# Patient Record
Sex: FEMALE | Race: ASIAN | NOT HISPANIC OR LATINO | Employment: FULL TIME | URBAN - METROPOLITAN AREA
[De-identification: names, ages, dates, MRNs, and addresses within clinical notes are randomized per-mention and may not be internally consistent; named-entity substitution may affect disease eponyms.]

---

## 2022-06-01 ENCOUNTER — TELEPHONE (OUTPATIENT)
Dept: SCHEDULING | Facility: IMAGING CENTER | Age: 27
End: 2022-06-01

## 2022-06-16 ENCOUNTER — TELEPHONE (OUTPATIENT)
Dept: SCHEDULING | Facility: IMAGING CENTER | Age: 27
End: 2022-06-16

## 2022-07-26 ENCOUNTER — OFFICE VISIT (OUTPATIENT)
Dept: MEDICAL GROUP | Facility: PHYSICIAN GROUP | Age: 27
End: 2022-07-26
Payer: COMMERCIAL

## 2022-07-26 VITALS
HEIGHT: 66 IN | DIASTOLIC BLOOD PRESSURE: 60 MMHG | OXYGEN SATURATION: 100 % | SYSTOLIC BLOOD PRESSURE: 90 MMHG | RESPIRATION RATE: 16 BRPM | TEMPERATURE: 98.3 F | WEIGHT: 120 LBS | BODY MASS INDEX: 19.29 KG/M2 | HEART RATE: 75 BPM

## 2022-07-26 DIAGNOSIS — F33.1 MODERATE EPISODE OF RECURRENT MAJOR DEPRESSIVE DISORDER (HCC): ICD-10-CM

## 2022-07-26 DIAGNOSIS — R00.0 INCREASED HEART RATE: ICD-10-CM

## 2022-07-26 DIAGNOSIS — R79.89 PROLACTIN INCREASED: ICD-10-CM

## 2022-07-26 PROBLEM — F32.9 MAJOR DEPRESSIVE DISORDER: Status: ACTIVE | Noted: 2022-07-26

## 2022-07-26 PROCEDURE — 93000 ELECTROCARDIOGRAM COMPLETE: CPT

## 2022-07-26 PROCEDURE — 99204 OFFICE O/P NEW MOD 45 MIN: CPT

## 2022-07-26 RX ORDER — BENZONATATE 100 MG/1
CAPSULE ORAL
COMMUNITY
Start: 2022-06-01 | End: 2022-07-26

## 2022-07-26 SDOH — ECONOMIC STABILITY: INCOME INSECURITY: IN THE LAST 12 MONTHS, WAS THERE A TIME WHEN YOU WERE NOT ABLE TO PAY THE MORTGAGE OR RENT ON TIME?: NO

## 2022-07-26 SDOH — ECONOMIC STABILITY: HOUSING INSECURITY: IN THE LAST 12 MONTHS, HOW MANY PLACES HAVE YOU LIVED?: 2

## 2022-07-26 SDOH — ECONOMIC STABILITY: HOUSING INSECURITY
IN THE LAST 12 MONTHS, WAS THERE A TIME WHEN YOU DID NOT HAVE A STEADY PLACE TO SLEEP OR SLEPT IN A SHELTER (INCLUDING NOW)?: NO

## 2022-07-26 SDOH — ECONOMIC STABILITY: FOOD INSECURITY: WITHIN THE PAST 12 MONTHS, THE FOOD YOU BOUGHT JUST DIDN'T LAST AND YOU DIDN'T HAVE MONEY TO GET MORE.: NEVER TRUE

## 2022-07-26 SDOH — ECONOMIC STABILITY: TRANSPORTATION INSECURITY
IN THE PAST 12 MONTHS, HAS LACK OF TRANSPORTATION KEPT YOU FROM MEETINGS, WORK, OR FROM GETTING THINGS NEEDED FOR DAILY LIVING?: NO

## 2022-07-26 SDOH — HEALTH STABILITY: PHYSICAL HEALTH: ON AVERAGE, HOW MANY MINUTES DO YOU ENGAGE IN EXERCISE AT THIS LEVEL?: 30 MIN

## 2022-07-26 SDOH — HEALTH STABILITY: PHYSICAL HEALTH: ON AVERAGE, HOW MANY DAYS PER WEEK DO YOU ENGAGE IN MODERATE TO STRENUOUS EXERCISE (LIKE A BRISK WALK)?: 1 DAY

## 2022-07-26 SDOH — ECONOMIC STABILITY: FOOD INSECURITY: WITHIN THE PAST 12 MONTHS, YOU WORRIED THAT YOUR FOOD WOULD RUN OUT BEFORE YOU GOT MONEY TO BUY MORE.: NEVER TRUE

## 2022-07-26 SDOH — ECONOMIC STABILITY: INCOME INSECURITY: HOW HARD IS IT FOR YOU TO PAY FOR THE VERY BASICS LIKE FOOD, HOUSING, MEDICAL CARE, AND HEATING?: NOT HARD AT ALL

## 2022-07-26 SDOH — HEALTH STABILITY: MENTAL HEALTH
STRESS IS WHEN SOMEONE FEELS TENSE, NERVOUS, ANXIOUS, OR CAN'T SLEEP AT NIGHT BECAUSE THEIR MIND IS TROUBLED. HOW STRESSED ARE YOU?: RATHER MUCH

## 2022-07-26 SDOH — ECONOMIC STABILITY: TRANSPORTATION INSECURITY
IN THE PAST 12 MONTHS, HAS THE LACK OF TRANSPORTATION KEPT YOU FROM MEDICAL APPOINTMENTS OR FROM GETTING MEDICATIONS?: NO

## 2022-07-26 SDOH — ECONOMIC STABILITY: TRANSPORTATION INSECURITY
IN THE PAST 12 MONTHS, HAS LACK OF RELIABLE TRANSPORTATION KEPT YOU FROM MEDICAL APPOINTMENTS, MEETINGS, WORK OR FROM GETTING THINGS NEEDED FOR DAILY LIVING?: NO

## 2022-07-26 ASSESSMENT — LIFESTYLE VARIABLES
SKIP TO QUESTIONS 9-10: 1
HOW MANY STANDARD DRINKS CONTAINING ALCOHOL DO YOU HAVE ON A TYPICAL DAY: PATIENT DOES NOT DRINK
HOW OFTEN DO YOU HAVE A DRINK CONTAINING ALCOHOL: NEVER
SUBSTANCE_ABUSE: 0
AUDIT-C TOTAL SCORE: 0
HOW OFTEN DO YOU HAVE SIX OR MORE DRINKS ON ONE OCCASION: NEVER

## 2022-07-26 ASSESSMENT — ENCOUNTER SYMPTOMS
SEIZURES: 0
FEVER: 0
DEPRESSION: 1
ABDOMINAL PAIN: 0
BLURRED VISION: 0
DIARRHEA: 0
SHORTNESS OF BREATH: 1
DIZZINESS: 0
WEAKNESS: 0
WEIGHT LOSS: 0
VOMITING: 0
HEADACHES: 0
CHILLS: 0
HEARTBURN: 0
CONSTIPATION: 0
WHEEZING: 0
COUGH: 0
NAUSEA: 0
DOUBLE VISION: 0

## 2022-07-26 ASSESSMENT — SOCIAL DETERMINANTS OF HEALTH (SDOH)
HOW OFTEN DO YOU GET TOGETHER WITH FRIENDS OR RELATIVES?: ONCE A WEEK
HOW HARD IS IT FOR YOU TO PAY FOR THE VERY BASICS LIKE FOOD, HOUSING, MEDICAL CARE, AND HEATING?: NOT HARD AT ALL
HOW OFTEN DO YOU ATTEND CHURCH OR RELIGIOUS SERVICES?: NEVER
DO YOU BELONG TO ANY CLUBS OR ORGANIZATIONS SUCH AS CHURCH GROUPS UNIONS, FRATERNAL OR ATHLETIC GROUPS, OR SCHOOL GROUPS?: NO
IN A TYPICAL WEEK, HOW MANY TIMES DO YOU TALK ON THE PHONE WITH FAMILY, FRIENDS, OR NEIGHBORS?: THREE TIMES A WEEK
HOW MANY DRINKS CONTAINING ALCOHOL DO YOU HAVE ON A TYPICAL DAY WHEN YOU ARE DRINKING: PATIENT DOES NOT DRINK
HOW OFTEN DO YOU ATTENT MEETINGS OF THE CLUB OR ORGANIZATION YOU BELONG TO?: NEVER
DO YOU BELONG TO ANY CLUBS OR ORGANIZATIONS SUCH AS CHURCH GROUPS UNIONS, FRATERNAL OR ATHLETIC GROUPS, OR SCHOOL GROUPS?: NO
HOW OFTEN DO YOU GET TOGETHER WITH FRIENDS OR RELATIVES?: ONCE A WEEK
HOW OFTEN DO YOU ATTENT MEETINGS OF THE CLUB OR ORGANIZATION YOU BELONG TO?: NEVER
HOW OFTEN DO YOU HAVE A DRINK CONTAINING ALCOHOL: NEVER
HOW OFTEN DO YOU ATTEND CHURCH OR RELIGIOUS SERVICES?: NEVER
IN A TYPICAL WEEK, HOW MANY TIMES DO YOU TALK ON THE PHONE WITH FAMILY, FRIENDS, OR NEIGHBORS?: THREE TIMES A WEEK
HOW OFTEN DO YOU HAVE SIX OR MORE DRINKS ON ONE OCCASION: NEVER
WITHIN THE PAST 12 MONTHS, YOU WORRIED THAT YOUR FOOD WOULD RUN OUT BEFORE YOU GOT THE MONEY TO BUY MORE: NEVER TRUE

## 2022-07-26 ASSESSMENT — PATIENT HEALTH QUESTIONNAIRE - PHQ9
CLINICAL INTERPRETATION OF PHQ2 SCORE: 4
SUM OF ALL RESPONSES TO PHQ QUESTIONS 1-9: 16
5. POOR APPETITE OR OVEREATING: 2 - MORE THAN HALF THE DAYS

## 2022-07-26 NOTE — ASSESSMENT & PLAN NOTE
EKG Interpretation   Ordered and interpreted by MARTHA Morejon  Rhythm: normal sinus   Rate: 72 normal   Axis: normal   Ectopy: none   Conduction: normal   ST Segments: no acute change   T Waves: no acute change   Q Waves: none   Clinical Impression: no acute changes and normal EKG     -Strongly recommend patient go to emergency room should this condition occur with out resolve or any loss of consciousness  - Strongly recommend drinking at least 64 ounces of water daily  - Referral to cardiology

## 2022-07-26 NOTE — PROGRESS NOTES
"Subjective:     CC:  Diagnoses of Prolactin increased, Increased heart rate, and Moderate episode of recurrent major depressive disorder (HCC) were pertinent to this visit.    HISTORY OF THE PRESENT ILLNESS: Patient is a 27 y.o. female. This pleasant patient is here today to establish care and discuss the following problems:    Problem   Prolactin Increased    Patient reports history of elevated prolactin in 2016 as well as discharge/drainage from both breast, she denied pregnancy. She had labs and CT scan in [place].  Subsequently during the CT scan she was given IV contrast and had an anaphylactic reaction during exam.  She is uncertain if the test was able to be completed.  She was not given results.  Currently in clinic she denies any lactation.  She would like to have prolactin levels repeated.     Increased Heart Rate    Patient has noted a 3-month history of episodic increased heart rate.  She has a smart watch and occasionally will feel short of breath and when she looks at her heart rate on her watch she reflects rate is 160-190. She denies incidents to be related to positional changes. She reports 2-3 Episodes per week lasting about 10 seconds.  The last episode was on Friday, July 22 heart rate was 180 bpm.  Patient was not doing anything exertional at that time nor changing positions. Denies CP during incidents but does reports to randomly have breif \"blackouts\" with these episodes. Patient reports with exercise her heart rate is 130-160.  She reports to have intolerance with shortness of breath, dizziness and has to sit down to recover. She does admit that her hydration status could be better.  She reports to drink only 1 L of water per day.  Denies use of stimulants.  Denies family history of sudden death.  Mother does have a history of unknown heart defects since birth.      Major Depressive Disorder    Chronic issue for over 1 year.  She has been seeing behavioral health therapist for about 2 years.  " "Denies suicidal ideations.  She does not take medications at this time but her therapist believes that she should consult with psychiatry for further evaluation of this condition.  Patient reports she is skeptical of taking medication.  She would like referral to psychiatry at this time.         Health Maintenance: Recommend follow-up visit for health maintenance topics    ROS:   Review of Systems   Constitutional: Negative for chills, fever, malaise/fatigue and weight loss.   Eyes: Negative for blurred vision and double vision.   Respiratory: Positive for shortness of breath (Episodic with increased heart rate). Negative for cough and wheezing.    Cardiovascular: Negative for chest pain.   Gastrointestinal: Negative for abdominal pain, constipation, diarrhea, heartburn, nausea and vomiting.   Neurological: Negative for dizziness, seizures, weakness and headaches.   Psychiatric/Behavioral: Positive for depression. Negative for substance abuse and suicidal ideas.         Objective:     Exam: BP (!) 90/60   Pulse 75   Temp 36.8 °C (98.3 °F)   Resp 16   Ht 1.676 m (5' 6\")   Wt 54.4 kg (120 lb)   SpO2 100%  Body mass index is 19.37 kg/m².    Physical Exam  Constitutional:       General: She is not in acute distress.     Appearance: Normal appearance. She is not ill-appearing or toxic-appearing.   HENT:      Head: Normocephalic.   Eyes:      Conjunctiva/sclera: Conjunctivae normal.   Cardiovascular:      Rate and Rhythm: Normal rate and regular rhythm.      Pulses: Normal pulses.      Heart sounds: Normal heart sounds. No murmur heard.  Pulmonary:      Effort: Pulmonary effort is normal. No respiratory distress.      Breath sounds: Normal breath sounds.   Skin:     General: Skin is warm and dry.   Neurological:      General: No focal deficit present.      Mental Status: She is alert and oriented to person, place, and time.   Psychiatric:         Mood and Affect: Mood normal.         Behavior: Behavior normal. "           Labs: No recent labs    Assessment & Plan: Medical Decision Making   27 y.o. female with the following -    Problem List Items Addressed This Visit     Prolactin increased     This is a chronic condition of uncertain prognosis  - Prolactin level ordered           Relevant Orders    PROLACTIN    Increased heart rate     EKG Interpretation   Ordered and interpreted by MARTHA Morejon  Rhythm: normal sinus   Rate: 72 normal   Axis: normal   Ectopy: none   Conduction: normal   ST Segments: no acute change   T Waves: no acute change   Q Waves: none   Clinical Impression: no acute changes and normal EKG     -Strongly recommend patient go to emergency room should this condition occur with out resolve or any loss of consciousness  - Strongly recommend drinking at least 64 ounces of water daily  - Referral to cardiology             Relevant Orders    EKG - Clinic Performed    HEMOGLOBIN A1C    CBC WITHOUT DIFFERENTIAL    Comp Metabolic Panel    Lipid Profile    TSH WITH REFLEX TO FT4    VITAMIN D,25 HYDROXY    REFERRAL TO CARDIOLOGY    Major depressive disorder     Chronic condition active  - Referral to psychiatry for further evaluation and possible medication management  - ED precautions given and known for suicidal ideations           Relevant Orders    Patient has been identified as having a positive depression screening. Appropriate orders and counseling have been given. (Completed)    Referral to Psychiatry          Differential diagnosis, natural history, supportive care, and indications for immediate follow-up discussed.  Shared decision making approach was utilized, and patient is amendable with plan of care.  Patient understands to return to clinic or go to the emergency department if symptoms worsen. All questions and concerns addressed.      Return in about 4 weeks (around 8/23/2022).    Please note that this dictation was created using voice recognition software. I have made every reasonable attempt to  correct obvious errors, but I expect that there are errors of grammar and possibly content that I did not discover before finalizing the note.

## 2022-07-26 NOTE — ASSESSMENT & PLAN NOTE
Chronic condition active  - Referral to psychiatry for further evaluation and possible medication management  - ED precautions given and known for suicidal ideations

## 2022-08-01 ENCOUNTER — HOSPITAL ENCOUNTER (OUTPATIENT)
Dept: LAB | Facility: MEDICAL CENTER | Age: 27
End: 2022-08-01
Payer: COMMERCIAL

## 2022-08-01 DIAGNOSIS — R79.89 PROLACTIN INCREASED: ICD-10-CM

## 2022-08-01 DIAGNOSIS — R00.0 INCREASED HEART RATE: ICD-10-CM

## 2022-08-01 LAB
25(OH)D3 SERPL-MCNC: 18 NG/ML (ref 30–100)
ALBUMIN SERPL BCP-MCNC: 4.2 G/DL (ref 3.2–4.9)
ALBUMIN/GLOB SERPL: 1.4 G/DL
ALP SERPL-CCNC: 72 U/L (ref 30–99)
ALT SERPL-CCNC: 16 U/L (ref 2–50)
ANION GAP SERPL CALC-SCNC: 9 MMOL/L (ref 7–16)
AST SERPL-CCNC: 18 U/L (ref 12–45)
BILIRUB SERPL-MCNC: 0.3 MG/DL (ref 0.1–1.5)
BUN SERPL-MCNC: 8 MG/DL (ref 8–22)
CALCIUM SERPL-MCNC: 8.8 MG/DL (ref 8.5–10.5)
CHLORIDE SERPL-SCNC: 108 MMOL/L (ref 96–112)
CHOLEST SERPL-MCNC: 171 MG/DL (ref 100–199)
CO2 SERPL-SCNC: 22 MMOL/L (ref 20–33)
CREAT SERPL-MCNC: 0.59 MG/DL (ref 0.5–1.4)
ERYTHROCYTE [DISTWIDTH] IN BLOOD BY AUTOMATED COUNT: 42 FL (ref 35.9–50)
EST. AVERAGE GLUCOSE BLD GHB EST-MCNC: 103 MG/DL
FASTING STATUS PATIENT QL REPORTED: NORMAL
GFR SERPLBLD CREATININE-BSD FMLA CKD-EPI: 126 ML/MIN/1.73 M 2
GLOBULIN SER CALC-MCNC: 3 G/DL (ref 1.9–3.5)
GLUCOSE SERPL-MCNC: 91 MG/DL (ref 65–99)
HBA1C MFR BLD: 5.2 % (ref 4–5.6)
HCT VFR BLD AUTO: 40.4 % (ref 37–47)
HDLC SERPL-MCNC: 31 MG/DL
HGB BLD-MCNC: 12.7 G/DL (ref 12–16)
LDLC SERPL CALC-MCNC: 107 MG/DL
MCH RBC QN AUTO: 26.6 PG (ref 27–33)
MCHC RBC AUTO-ENTMCNC: 31.4 G/DL (ref 33.6–35)
MCV RBC AUTO: 84.5 FL (ref 81.4–97.8)
PLATELET # BLD AUTO: 250 K/UL (ref 164–446)
PMV BLD AUTO: 13 FL (ref 9–12.9)
POTASSIUM SERPL-SCNC: 4.1 MMOL/L (ref 3.6–5.5)
PROLACTIN SERPL-MCNC: 21.2 NG/ML (ref 2.8–26)
PROT SERPL-MCNC: 7.2 G/DL (ref 6–8.2)
RBC # BLD AUTO: 4.78 M/UL (ref 4.2–5.4)
SODIUM SERPL-SCNC: 139 MMOL/L (ref 135–145)
TRIGL SERPL-MCNC: 163 MG/DL (ref 0–149)
TSH SERPL DL<=0.005 MIU/L-ACNC: 1.73 UIU/ML (ref 0.38–5.33)
WBC # BLD AUTO: 6.3 K/UL (ref 4.8–10.8)

## 2022-08-01 PROCEDURE — 84443 ASSAY THYROID STIM HORMONE: CPT

## 2022-08-01 PROCEDURE — 85027 COMPLETE CBC AUTOMATED: CPT

## 2022-08-01 PROCEDURE — 84146 ASSAY OF PROLACTIN: CPT

## 2022-08-01 PROCEDURE — 80061 LIPID PANEL: CPT

## 2022-08-01 PROCEDURE — 82306 VITAMIN D 25 HYDROXY: CPT

## 2022-08-01 PROCEDURE — 83036 HEMOGLOBIN GLYCOSYLATED A1C: CPT

## 2022-08-01 PROCEDURE — 36415 COLL VENOUS BLD VENIPUNCTURE: CPT

## 2022-08-01 PROCEDURE — 80053 COMPREHEN METABOLIC PANEL: CPT

## 2022-08-02 DIAGNOSIS — R79.89 ABNORMAL CBC: ICD-10-CM

## 2022-08-02 NOTE — PROGRESS NOTES
Abnormal CBC with low MCH level and elevated MPV.  Hemoglobin hematocrit RBCs and platelets within normal limits

## 2022-08-23 ENCOUNTER — OFFICE VISIT (OUTPATIENT)
Dept: MEDICAL GROUP | Facility: PHYSICIAN GROUP | Age: 27
End: 2022-08-23
Payer: COMMERCIAL

## 2022-08-23 VITALS
DIASTOLIC BLOOD PRESSURE: 50 MMHG | OXYGEN SATURATION: 100 % | SYSTOLIC BLOOD PRESSURE: 90 MMHG | TEMPERATURE: 97.6 F | HEART RATE: 84 BPM | BODY MASS INDEX: 19.7 KG/M2 | WEIGHT: 122.6 LBS | HEIGHT: 66 IN

## 2022-08-23 DIAGNOSIS — R00.0 INCREASED HEART RATE: ICD-10-CM

## 2022-08-23 DIAGNOSIS — E55.9 VITAMIN D DEFICIENCY: ICD-10-CM

## 2022-08-23 DIAGNOSIS — F33.1 MODERATE EPISODE OF RECURRENT MAJOR DEPRESSIVE DISORDER (HCC): ICD-10-CM

## 2022-08-23 PROCEDURE — 99213 OFFICE O/P EST LOW 20 MIN: CPT

## 2022-08-23 ASSESSMENT — FIBROSIS 4 INDEX: FIB4 SCORE: .486

## 2022-08-23 NOTE — ASSESSMENT & PLAN NOTE
Subacute, active  -Continue vigorous hydration, consuming at least 64 oz of water daily  -Exercise at least 30 minutes most days of the week  -ED precautions for exacerbation of condition including any CP or SOB

## 2022-08-23 NOTE — PROGRESS NOTES
"Subjective:     CC: Follow-up for vitamin D deficiency, tachycardia, and depression    HPI:   Dyllan presents today with    Problem   Vitamin D Deficiency    Chronic condition, active  Recently Started supplementing with 1000 IU + multivitamin as well.     Increased Heart Rate    Condition intermittently present but improved since last visit in July 2022.  Patient has noticed improvement with only 2 instnaces in the past month with  lasting 10 seconds. She has started hydrating with water and feels this is helping.  She does have an appointment with  in October 2022 for ongoing depression/anxiety.  She feels instances could be related to anxiety but is unsure.     Major Depressive Disorder    Chronic condition active   Seeing behavioral health counselor and has appointment with psychiatry in October   Recommend exercising daily for at least 30 minutes most days of the week   -Recommend healthy diet with abundant fruits and vegetables   -ED precautions given and known for          Health Maintenance: Recommend  visit    ROS:  Review of Systems   All other systems reviewed and are negative.    Objective:     Exam:  BP (!) 90/50 (BP Location: Left arm, Patient Position: Sitting, BP Cuff Size: Adult)   Pulse 84   Temp 36.4 °C (97.6 °F) (Temporal)   Ht 1.676 m (5' 6\")   Wt 55.6 kg (122 lb 9.6 oz)   LMP 08/01/2022 (Approximate)   SpO2 100%   BMI 19.79 kg/m²  Body mass index is 19.79 kg/m².    Physical Exam  Constitutional:       General: She is not in acute distress.     Appearance: Normal appearance. She is not ill-appearing or toxic-appearing.   HENT:      Head: Normocephalic.   Eyes:      Conjunctiva/sclera: Conjunctivae normal.   Cardiovascular:      Rate and Rhythm: Normal rate and regular rhythm.      Pulses: Normal pulses.      Heart sounds: Normal heart sounds. No murmur heard.  Pulmonary:      Effort: Pulmonary effort is normal. No respiratory distress.      Breath sounds: Normal breath sounds. "   Skin:     General: Skin is warm and dry.   Neurological:      General: No focal deficit present.      Mental Status: She is alert and oriented to person, place, and time.   Psychiatric:         Mood and Affect: Mood normal.         Behavior: Behavior normal.       Labs: Labs done 8/1/2022 show CBC within normal limits with the exception of decrease in MCH and increase in MPV, CMP within normal limits, lipid panel with elevated triglycerides and low HDL, vitamin D low at 18, TSH within normal limits, prolactin within normal limits    Assessment & Plan: Medical Decision Making     27 y.o. female with the following -     Problem List Items Addressed This Visit       Increased heart rate     Subacute, active  -Continue vigorous hydration, consuming at least 64 oz of water daily  -Exercise at least 30 minutes most days of the week  -ED precautions for exacerbation of condition including any CP or SOB         Major depressive disorder    Vitamin D deficiency     Chronic condition active  - Continue supplementing with over-the-counter vitamin D 1000 IUs daily including multivitamin            Differential diagnosis, natural history, supportive care, and indications for immediate follow-up discussed.  Shared decision making approach utilized, and patient is amendable with plan of care.  Patient understands to return to clinic or go to the emergency department if symptoms worsen. All questions and concerns addressed.    Return in about 3 months (around 11/23/2022) for Wellness with Pap.    Please note that this dictation was created using voice recognition software. I have made every reasonable attempt to correct obvious errors, but I expect that there are errors of grammar and possibly content that I did not discover before finalizing the note.

## 2022-10-04 ENCOUNTER — TELEPHONE (OUTPATIENT)
Dept: CARDIOLOGY | Facility: MEDICAL CENTER | Age: 27
End: 2022-10-04

## 2022-10-04 ENCOUNTER — OFFICE VISIT (OUTPATIENT)
Dept: BEHAVIORAL HEALTH | Facility: CLINIC | Age: 27
End: 2022-10-04
Payer: COMMERCIAL

## 2022-10-04 DIAGNOSIS — F41.1 GENERALIZED ANXIETY DISORDER: ICD-10-CM

## 2022-10-04 DIAGNOSIS — F33.1 MAJOR DEPRESSIVE DISORDER, RECURRENT EPISODE, MODERATE (HCC): ICD-10-CM

## 2022-10-04 PROCEDURE — 90792 PSYCH DIAG EVAL W/MED SRVCS: CPT | Performed by: PSYCHIATRY & NEUROLOGY

## 2022-10-04 NOTE — TELEPHONE ENCOUNTER
Chart Prep    Called patient in regards to records for NP appointment with Dr. Gaines. To review most recent lab results, ekg, any cardiac testing or ,if she has been treated by a cardiologist. No answer, left voicemail to call back
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
## 2022-10-05 ENCOUNTER — DOCUMENTATION (OUTPATIENT)
Dept: BEHAVIORAL HEALTH | Facility: CLINIC | Age: 27
End: 2022-10-05
Payer: COMMERCIAL

## 2022-10-13 ENCOUNTER — OFFICE VISIT (OUTPATIENT)
Dept: CARDIOLOGY | Facility: MEDICAL CENTER | Age: 27
End: 2022-10-13
Payer: COMMERCIAL

## 2022-10-13 ENCOUNTER — NON-PROVIDER VISIT (OUTPATIENT)
Dept: CARDIOLOGY | Facility: MEDICAL CENTER | Age: 27
End: 2022-10-13
Attending: INTERNAL MEDICINE
Payer: COMMERCIAL

## 2022-10-13 VITALS
SYSTOLIC BLOOD PRESSURE: 100 MMHG | DIASTOLIC BLOOD PRESSURE: 80 MMHG | BODY MASS INDEX: 19.61 KG/M2 | OXYGEN SATURATION: 100 % | HEART RATE: 88 BPM | HEIGHT: 66 IN | WEIGHT: 122 LBS | RESPIRATION RATE: 16 BRPM

## 2022-10-13 DIAGNOSIS — I49.1 PREMATURE ATRIAL CONTRACTIONS: ICD-10-CM

## 2022-10-13 DIAGNOSIS — E78.2 HYPERCHOLESTEROLEMIA WITH HYPERTRIGLYCERIDEMIA: ICD-10-CM

## 2022-10-13 DIAGNOSIS — I47.10 PAROXYSMAL SVT (SUPRAVENTRICULAR TACHYCARDIA) (HCC): ICD-10-CM

## 2022-10-13 DIAGNOSIS — I49.3 PVCS (PREMATURE VENTRICULAR CONTRACTIONS): ICD-10-CM

## 2022-10-13 PROCEDURE — 99204 OFFICE O/P NEW MOD 45 MIN: CPT | Performed by: INTERNAL MEDICINE

## 2022-10-13 ASSESSMENT — ENCOUNTER SYMPTOMS
WEAKNESS: 0
DIARRHEA: 0
ORTHOPNEA: 0
FALLS: 0
MYALGIAS: 0
WHEEZING: 0
BLURRED VISION: 0
DIAPHORESIS: 0
CONSTIPATION: 0
EXCESSIVE DAYTIME SLEEPINESS: 0
NIGHT SWEATS: 0
LOSS OF BALANCE: 0
LIGHT-HEADEDNESS: 0
NAUSEA: 0
HEADACHES: 0
FLANK PAIN: 0
SLEEP DISTURBANCES DUE TO BREATHING: 0
DIZZINESS: 0
SORE THROAT: 0
VOMITING: 0
SHORTNESS OF BREATH: 0
BACK PAIN: 0
COUGH: 0
IRREGULAR HEARTBEAT: 0
PND: 0
BLOATING: 0
DEPRESSION: 1
PALPITATIONS: 1
NERVOUS/ANXIOUS: 1
DOUBLE VISION: 0
FEVER: 0
NUMBNESS: 0
DYSPNEA ON EXERTION: 0
SYNCOPE: 0
PARESTHESIAS: 0
NEAR-SYNCOPE: 0
DECREASED APPETITE: 0

## 2022-10-13 ASSESSMENT — FIBROSIS 4 INDEX: FIB4 SCORE: .486

## 2022-10-13 NOTE — PROGRESS NOTES
Cardiology Initial Consultation Note    Date of note:    10/13/2022    Primary Care Provider: Viral Petersen D.N.P.  Referring Provider: Viral Petersen D.N.P.     Patient Name: Dyllan Freeman   YOB: 1995  MRN:              [id number]    Chief Complaint   Patient presents with    Other     NP Dx: Increased heart rate       History of Present Illness: Ms. Dyllan Freeman is a 27 y.o. female whose current medical problems include TEOFILO who is here for cardiac consultation for palpitations and fast heartbeat.  Is here with her .    Patient states that she recently purchased an apple watch and has been getting alerts of heart rate up to 180 bpm while she is resting and with no activity or stress.  Episodes will last for few minutes with self resolution.  Is unsure why she is having spikes in her heart rate with a minimum in the 30s and maximum in 180s.  Overall, she does not have lightheadedness, dizziness, shortness of breath during those times.    In terms of physical activity, exercises on a regular basis but feels that she gets winded and tired quickly compared to her  and does notice elevated heart rate out of proportion to the amount of exercise she is doing.  Also get lightheaded, dizzy with exercise and has to stop.     Cardiovascular Risk Factors:  1. Smoking status: Never smoker  2. Type II Diabetes Mellitus: No  Lab Results   Component Value Date/Time    HBA1C 5.2 08/01/2022 06:41 AM     3. Hypertension: No  4. Dyslipidemia:    Cholesterol,Tot   Date Value Ref Range Status   08/01/2022 171 100 - 199 mg/dL Final     LDL   Date Value Ref Range Status   08/01/2022 107 (H) <100 mg/dL Final     HDL   Date Value Ref Range Status   08/01/2022 31 (A) >=40 mg/dL Final     Triglycerides   Date Value Ref Range Status   08/01/2022 163 (H) 0 - 149 mg/dL Final     5. Family history of early Coronary Artery Disease in a first degree relative (Male less than 55 years of age; Female less  than 65 years of age): Grandparents had stroke, no family history of CAD  6.  Obesity and/or Metabolic Syndrome: No  7. Sedentary lifestyle: No    Review of Systems   Constitutional: Negative for decreased appetite, diaphoresis, fever, malaise/fatigue and night sweats.   HENT:  Negative for congestion and sore throat.    Eyes:  Negative for blurred vision and double vision.   Cardiovascular:  Positive for palpitations. Negative for chest pain, cyanosis, dyspnea on exertion, irregular heartbeat, leg swelling, near-syncope, orthopnea, paroxysmal nocturnal dyspnea and syncope.   Respiratory:  Negative for cough, shortness of breath, sleep disturbances due to breathing and wheezing.    Endocrine: Negative for cold intolerance and heat intolerance.   Musculoskeletal:  Negative for back pain, falls and myalgias.   Gastrointestinal:  Negative for bloating, constipation, diarrhea, nausea and vomiting.   Genitourinary:  Negative for dysuria and flank pain.   Neurological:  Negative for excessive daytime sleepiness, dizziness, headaches, light-headedness, loss of balance, numbness, paresthesias and weakness.   Psychiatric/Behavioral:  Positive for depression. The patient is nervous/anxious.          Past Medical History:   Diagnosis Date    Anemia     Depression     Vitamin D deficiency          History reviewed. No pertinent surgical history.      Current Outpatient Medications   Medication Sig Dispense Refill    Cholecalciferol (VITAMIN D3 PO) Take 1 Capsule by mouth every day.      Multiple Vitamin (MULTIVITAMIN PO) Take 1 Capsule by mouth every day.       No current facility-administered medications for this visit.         Allergies   Allergen Reactions    Iodine Contrast [[employer]] Anaphylaxis         Family History   Problem Relation Age of Onset    Hypertension Mother     Diabetes Mother     Heart Disease Mother     No Known Problems Father     Stroke Maternal Grandfather     Stroke Paternal  Grandmother     Dementia Paternal Grandfather          Social History     Socioeconomic History    Marital status:      Spouse name: Not on file    Number of children: Not on file    Years of education: Not on file    Highest education level: Master's degree (e.g., MA, MS, Tate, MEd, MSW, YURIY)   Occupational History    Not on file   Tobacco Use    Smoking status: Never    Smokeless tobacco: Never   Vaping Use    Vaping Use: Never used   Substance and Sexual Activity    Alcohol use: Not Currently    Drug use: Not Currently    Sexual activity: Not Currently   Other Topics Concern    Not on file   Social History Narrative    Not on file     Social Determinants of Health     Financial Resource Strain: Low Risk     Difficulty of Paying Living Expenses: Not hard at all   Food Insecurity: No Food Insecurity    Worried About Running Out of Food in the Last Year: Never true    Ran Out of Food in the Last Year: Never true   Transportation Needs: No Transportation Needs    Lack of Transportation (Medical): No    Lack of Transportation (Non-Medical): No   Physical Activity: Insufficiently Active    Days of Exercise per Week: 1 day    Minutes of Exercise per Session: 30 min   Stress: Stress Concern Present    Feeling of Stress : Rather much   Social Connections: Moderately Isolated    Frequency of Communication with Friends and Family: Three times a week    Frequency of Social Gatherings with Friends and Family: Once a week    Attends Buddhism Services: Never    Active Member of Clubs or Organizations: No    Attends Club or Organization Meetings: Never    Marital Status:    Intimate Partner Violence: Not on file   Housing Stability: Low Risk     Unable to Pay for Housing in the Last Year: No    Number of Places Lived in the Last Year: 2    Unstable Housing in the Last Year: No         Physical Exam:  Ambulatory Vitals  /80 (BP Location: Left arm, Patient Position: Sitting, BP Cuff Size: Adult)   Pulse 88    "Resp 16   Ht 1.676 m (5' 6\")   Wt 55.3 kg (122 lb)   SpO2 100%    Oxygen Therapy:  Pulse Oximetry: 100 %  BP Readings from Last 4 Encounters:   10/13/22 100/80   08/23/22 (!) 90/50   07/26/22 (!) 90/60       Weight/BMI: Body mass index is 19.69 kg/m².  Wt Readings from Last 4 Encounters:   10/13/22 55.3 kg (122 lb)   08/23/22 55.6 kg (122 lb 9.6 oz)   07/26/22 54.4 kg (120 lb)         General: Well appearing and in no apparent distress  Eyes: nl conjunctiva, no icteric sclera  ENT: wearing a mask, normal external appearance of ears  Neck: no visible JVP,  no carotid bruits  Lungs: normal respiratory effort, CTAB  Heart: RRR, no murmurs, no rubs or gallops,  no edema bilateral lower extremities. No LV/RV heave on cardiac palpatation. 2+ bilateral radial pulses.  2+ bilateral dp pulses.   Abdomen: soft, non tender, non distended, no masses, normal bowel sounds.  No HSM.  Extremities/MSK: no clubbing, no cyanosis  Neurological: No focal sensory deficits  Psychiatric: Appropriate affect, A/O x 3, intact judgement and insight  Skin: Warm extremities      Lab Data Review:  Lab Results   Component Value Date/Time    CHOLSTRLTOT 171 08/01/2022 06:41 AM     (H) 08/01/2022 06:41 AM    HDL 31 (A) 08/01/2022 06:41 AM    TRIGLYCERIDE 163 (H) 08/01/2022 06:41 AM       Lab Results   Component Value Date/Time    SODIUM 139 08/01/2022 06:41 AM    POTASSIUM 4.1 08/01/2022 06:41 AM    CHLORIDE 108 08/01/2022 06:41 AM    CO2 22 08/01/2022 06:41 AM    GLUCOSE 91 08/01/2022 06:41 AM    BUN 8 08/01/2022 06:41 AM    CREATININE 0.59 08/01/2022 06:41 AM     Lab Results   Component Value Date/Time    ALKPHOSPHAT 72 08/01/2022 06:41 AM    ASTSGOT 18 08/01/2022 06:41 AM    ALTSGPT 16 08/01/2022 06:41 AM    TBILIRUBIN 0.3 08/01/2022 06:41 AM      Lab Results   Component Value Date/Time    WBC 6.3 08/01/2022 06:41 AM     Lab Results   Component Value Date/Time    HBA1C 5.2 08/01/2022 06:41 AM         Cardiac Imaging and Procedures " Review:    EKG dated 7/26/2022: My personal interpretation is sinus rhythm      Assessment & Plan     1. Paroxysmal SVT (supraventricular tachycardia) (HCC)  Cardiac Event Monitor      2. Hypercholesterolemia with hypertriglyceridemia              Shared Medical Decision Making:  Obtain 14 days cardiac event monitor to rule out any underlying arrhythmia associated with symptoms.  Encouraged patient to trigger the device and write down her symptoms to best correlate them with underlying rhythm to which she voices understanding.    We discussed possibility of initiating beta-blocker therapy with metoprolol if she is found to have symptomatic SVT episodes.  We also discussed adequate hydration with electrolytes prior to exercise.  She does have low blood pressure which might be contributing to some of her symptoms.    In regards to mixed hyperlipidemia, we discussed dietary changes with decrease dairy intake and/or nonfat options.  She is a vegetarian.  Likely familial hyperlipidemia.  We discussed that there is no indication to start statin medication at this time but something that she may need in the future.      All of patient's excellent questions were answered to the best of my knowledge and to her satisfaction.  It was a pleasure seeing Ms. Dyllan Freeman in my clinic today.  Patient is aware to call the cardiology clinic with any questions or concerns.      Thai Gaines MD  [facility]  [facility], [address]  [address] [address]  Phone: [phone]  Fax: [phone]    Please note that this dictation was created using voice recognition software. I have made every reasonable attempt to correct obvious errors, but it is possible there are errors of grammar and possibly content that I did not discover before finalizing the note.

## 2022-10-13 NOTE — PROGRESS NOTES
Patient enrolled in the 14 day ePatch Holter monitoring program per Thai Gaines MD.  >Office hook-up, serial # [id number].  >Currently pending EOS.

## 2022-11-07 ENCOUNTER — TELEPHONE (OUTPATIENT)
Dept: CARDIOLOGY | Facility: MEDICAL CENTER | Age: 27
End: 2022-11-07
Payer: COMMERCIAL

## 2022-11-08 PROCEDURE — 93228 REMOTE 30 DAY ECG REV/REPORT: CPT | Performed by: INTERNAL MEDICINE

## 2022-12-29 ENCOUNTER — TELEMEDICINE (OUTPATIENT)
Dept: MEDICAL GROUP | Facility: PHYSICIAN GROUP | Age: 27
End: 2022-12-29
Payer: COMMERCIAL

## 2022-12-29 VITALS — BODY MASS INDEX: 19.29 KG/M2 | TEMPERATURE: 101 F | WEIGHT: 120 LBS | HEIGHT: 66 IN

## 2022-12-29 DIAGNOSIS — U07.1 COVID: ICD-10-CM

## 2022-12-29 PROCEDURE — 99213 OFFICE O/P EST LOW 20 MIN: CPT | Mod: 95

## 2022-12-29 RX ORDER — BENZONATATE 200 MG/1
200 CAPSULE ORAL 3 TIMES DAILY PRN
Qty: 60 CAPSULE | Refills: 0 | Status: SHIPPED | OUTPATIENT
Start: 2022-12-29 | End: 2023-05-18

## 2022-12-29 ASSESSMENT — FIBROSIS 4 INDEX: FIB4 SCORE: .486

## 2022-12-29 NOTE — PROGRESS NOTES
"Virtual Visit: Established Patient   This visit was conducted via Zoom using secure and encrypted videoconferencing technology.   The patient was in their home in the state of [place].    The patient's identity was confirmed and verbal consent was obtained for this virtual visit.    Subjective:   CC:   Chief Complaint   Patient presents with    Runny Nose     Body aches, fatigue, fever (101), headaches, sore-throat, cough x 3 days  Pt. Tested (+) COVID yesterday       Dyllan Freeman is a 27 y.o. female presenting for evaluation and management of:    Problem   Covid    Symptoms started 12/25/22. Reports sore throat, fever, nasal congestion, dry cough, mild SOB, CP. Patient reports progressive fatigue and joint pain and does not feel that she is getting better since symptoms began  -Went to  12/28- and got confirmation.  OTC cough medicine was recommended.    -Treatment tried: cough drops           ROS   See HPI    Current medicines (including changes today)  Current Outpatient Medications   Medication Sig Dispense Refill    benzonatate (TESSALON) 200 MG capsule Take 1 Capsule by mouth 3 times a day as needed for Cough. 60 Capsule 0    Cholecalciferol (VITAMIN D3 PO) Take 1 Capsule by mouth every day.      Multiple Vitamin (MULTIVITAMIN PO) Take 1 Capsule by mouth every day.       No current facility-administered medications for this visit.       Patient Active Problem List    Diagnosis Date Noted    COVID 12/29/2022    Paroxysmal SVT (supraventricular tachycardia) (HCC) 10/13/2022    Major depressive disorder, recurrent episode, moderate (HCC) 10/04/2022    Generalized anxiety disorder 10/04/2022    Vitamin D deficiency 08/23/2022    Prolactin increased 07/26/2022    Increased heart rate 07/26/2022    Major depressive disorder 07/26/2022        Objective:   Temp (!) 38.3 °C (101 °F) (Temporal) Comment: Pt reported  Ht 1.676 m (5' 6\") Comment: Pt reported  Wt 54.4 kg (120 lb) Comment: Pt reported  LMP " 12/14/2022   BMI 19.37 kg/m²     Physical Exam:  Constitutional: Alert, no distress, well-groomed.  Skin: No rashes in visible areas.  Eye: Round. Conjunctiva clear, lids normal. No icterus.   ENMT: Lips pink without lesions, good dentition, moist mucous membranes. Phonation normal.  Neck: No masses, no thyromegaly. Moves freely without pain.  Respiratory: Unlabored respiratory effort, +cough, NO audible wheeze  Psych: Alert and oriented x3, normal affect and mood.     Assessment and Plan:   The following treatment plan was discussed:   Problem List Items Addressed This Visit       COVID     Self-limited condition  -Recommend over-the-counter pain relief such as ibuprofen or Tylenol for discomfort and fever  -Recommend vigorous hydration of at least 64 ounces of fluid per day and rest  -Recommend isolation until symptoms subside.  If you do have to go into public is recommended to wear a mask.  Per CDC guidelines recommended 5 days of isolation from onset of symptoms  -ED precautions given and known for worsening or progression of this condition  -Tessalon Perles 200 mg 3 times a day if needed for coughing           Relevant Medications    benzonatate (TESSALON) 200 MG capsule       1. COVID  - benzonatate (TESSALON) 200 MG capsule; Take 1 Capsule by mouth 3 times a day as needed for Cough.  Dispense: 60 Capsule; Refill: 0      Follow-up: Return if symptoms worsen or fail to improve.

## 2022-12-29 NOTE — ASSESSMENT & PLAN NOTE
Self-limited condition  -Recommend over-the-counter pain relief such as ibuprofen or Tylenol for discomfort and fever  -Recommend vigorous hydration of at least 64 ounces of fluid per day and rest  -Recommend isolation until symptoms subside.  If you do have to go into public is recommended to wear a mask.  Per CDC guidelines recommended 5 days of isolation from onset of symptoms  -ED precautions given and known for worsening or progression of this condition  -Tessalon Perles 200 mg 3 times a day if needed for coughing

## 2023-05-18 ENCOUNTER — OFFICE VISIT (OUTPATIENT)
Dept: MEDICAL GROUP | Facility: PHYSICIAN GROUP | Age: 28
End: 2023-05-18
Payer: COMMERCIAL

## 2023-05-18 VITALS
BODY MASS INDEX: 18.45 KG/M2 | WEIGHT: 114.8 LBS | DIASTOLIC BLOOD PRESSURE: 60 MMHG | SYSTOLIC BLOOD PRESSURE: 100 MMHG | OXYGEN SATURATION: 90 % | TEMPERATURE: 97.2 F | HEIGHT: 66 IN | HEART RATE: 77 BPM

## 2023-05-18 DIAGNOSIS — N97.9 FEMALE FERTILITY PROBLEM: ICD-10-CM

## 2023-05-18 DIAGNOSIS — E78.5 DYSLIPIDEMIA: ICD-10-CM

## 2023-05-18 DIAGNOSIS — Z13.29 SCREENING FOR THYROID DISORDER: ICD-10-CM

## 2023-05-18 DIAGNOSIS — Z11.59 NEED FOR HEPATITIS C SCREENING TEST: ICD-10-CM

## 2023-05-18 DIAGNOSIS — Z00.00 HEALTHCARE MAINTENANCE: ICD-10-CM

## 2023-05-18 DIAGNOSIS — F33.42 RECURRENT MAJOR DEPRESSIVE DISORDER, IN FULL REMISSION (HCC): ICD-10-CM

## 2023-05-18 DIAGNOSIS — E55.9 VITAMIN D DEFICIENCY: ICD-10-CM

## 2023-05-18 PROBLEM — E61.1 IRON DEFICIENCY: Status: ACTIVE | Noted: 2021-06-02

## 2023-05-18 PROBLEM — F33.1 MAJOR DEPRESSIVE DISORDER, RECURRENT EPISODE, MODERATE (HCC): Status: RESOLVED | Noted: 2022-10-04 | Resolved: 2023-05-18

## 2023-05-18 PROCEDURE — 99214 OFFICE O/P EST MOD 30 MIN: CPT

## 2023-05-18 PROCEDURE — 3078F DIAST BP <80 MM HG: CPT

## 2023-05-18 PROCEDURE — 3074F SYST BP LT 130 MM HG: CPT

## 2023-05-18 ASSESSMENT — ENCOUNTER SYMPTOMS
BLURRED VISION: 0
VOMITING: 0
DIZZINESS: 0
NAUSEA: 0
WEIGHT LOSS: 0
SHORTNESS OF BREATH: 0
COUGH: 0
DIARRHEA: 0
WEAKNESS: 0
ABDOMINAL PAIN: 0
FEVER: 0
CONSTIPATION: 0
HEADACHES: 0
MYALGIAS: 0
CHILLS: 0

## 2023-05-18 ASSESSMENT — FIBROSIS 4 INDEX: FIB4 SCORE: .504

## 2023-05-18 ASSESSMENT — PATIENT HEALTH QUESTIONNAIRE - PHQ9: CLINICAL INTERPRETATION OF PHQ2 SCORE: 0

## 2023-05-18 NOTE — PROGRESS NOTES
"Subjective:     CC: Fertility concerns    HPI:   Dyllan presents today with    Problem   Female Fertility Problem    Reports she has been trying to get pregnant for about a year  -During the first 6 months of trying to get pregnant sex has been sporadic a couple times a month.  But over the last 6 months patient and her mate have been having sex every day during ovulation.  She has been using ovulation test kits which has been reliable based on her menstrual cycle.  - has not been evaluated for physical exam or sperm analysis  -Taking prenatals daily       Recurrent Major Depressive Disorder, in Full Remission (Hcc)    Chronic condition stable  -Patient not taking medication for this condition though she was seen psychology on a regular basis.  -Feels good today no complaints.  She denies suicidal ideations.  Recommend exercising daily for at least 30 minutes most days of the week   -Recommend healthy diet with abundant fruits and vegetables          Iron Deficiency   Major Depressive Disorder, Recurrent Episode, Moderate (Hcc) (Resolved)       Health Maintenance: Declines HM topics at this time    ROS:  Review of Systems   Constitutional:  Negative for chills, fever, malaise/fatigue and weight loss.   Eyes:  Negative for blurred vision.   Respiratory:  Negative for cough and shortness of breath.    Cardiovascular:  Negative for chest pain.   Gastrointestinal:  Negative for abdominal pain, constipation, diarrhea, nausea and vomiting.   Musculoskeletal:  Negative for myalgias.   Neurological:  Negative for dizziness, weakness and headaches.       Objective:     Exam:  /60 (BP Location: Left arm, Patient Position: Sitting, BP Cuff Size: Adult)   Pulse 77   Temp 36.2 °C (97.2 °F) (Temporal)   Ht 1.676 m (5' 6\")   Wt 52.1 kg (114 lb 12.8 oz)   SpO2 90%   BMI 18.53 kg/m²  Body mass index is 18.53 kg/m².    Physical Exam  Constitutional:       General: She is not in acute distress.     Appearance: " Normal appearance. She is not ill-appearing or toxic-appearing.   HENT:      Head: Normocephalic.   Eyes:      Conjunctiva/sclera: Conjunctivae normal.   Pulmonary:      Effort: Pulmonary effort is normal.   Skin:     General: Skin is warm and dry.   Neurological:      General: No focal deficit present.      Mental Status: She is alert and oriented to person, place, and time.   Psychiatric:         Mood and Affect: Mood normal.         Behavior: Behavior normal.         Labs:   Lab Results   Component Value Date/Time    CHOLSTRLTOT 171 08/01/2022 06:41 AM     (H) 08/01/2022 06:41 AM    HDL 31 (A) 08/01/2022 06:41 AM    TRIGLYCERIDE 163 (H) 08/01/2022 06:41 AM       Lab Results   Component Value Date/Time    SODIUM 139 08/01/2022 06:41 AM    POTASSIUM 4.1 08/01/2022 06:41 AM    CHLORIDE 108 08/01/2022 06:41 AM    CO2 22 08/01/2022 06:41 AM    GLUCOSE 91 08/01/2022 06:41 AM    BUN 8 08/01/2022 06:41 AM    CREATININE 0.59 08/01/2022 06:41 AM     Lab Results   Component Value Date/Time    ALKPHOSPHAT 72 08/01/2022 06:41 AM    ASTSGOT 18 08/01/2022 06:41 AM    ALTSGPT 16 08/01/2022 06:41 AM    TBILIRUBIN 0.3 08/01/2022 06:41 AM      Lab Results   Component Value Date/Time    HBA1C 5.2 08/01/2022 06:41 AM     No results found for: TSH  No results found for: FREET4  No results found for: CBC      Assessment & Plan: Medical Decision Making     28 y.o. female with the following -     1. Female fertility problem  Undiagnosed condition of uncertain prognosis  -Given patient has only diligently been attempting conception for the past 6 months with regular intercourse based around ovulation, will consider referral to fertility specialist in 6 months approximately November 2023 should her efforts remain unsuccessful.  Have advised patient to have her  arrange appointment with PCP to have physical exam as well as evaluation of sperm.  - CBC WITHOUT DIFFERENTIAL; Future  - Comp Metabolic Panel; Future  - Lipid  Profile; Future  - VITAMIN D,25 HYDROXY (DEFICIENCY); Future  - TSH WITH REFLEX TO FT4; Future    2. Healthcare maintenance    - Referral to Genetic Research Studies    3. Vitamin D deficiency  Chronic in nature we will have patient continue to take vitamin D 1000 to 2000 IUs daily  - VITAMIN D,25 HYDROXY (DEFICIENCY); Future    4. Dyslipidemia  Chronic  --Exercise: At least 150 minutes of moderate aerobic activity per week or 75 minutes of vigorous aerobic activity per week, +2 days/week of strength training  - Healthy lifestyle and eating habits: Mediterranean-based diet (rich in fruits, vegetables, nuts and healthy oils), proper hydration and avoiding sugary beverages, adequate sleep hygiene-(allowing 7 to 8 hours of overnight sleep).    - CBC WITHOUT DIFFERENTIAL; Future  - Comp Metabolic Panel; Future  - Lipid Profile; Future    5. Screening for thyroid disorder    - TSH WITH REFLEX TO FT4; Future    6. Need for hepatitis C screening test    - HEP C VIRUS ANTIBODY; Future    7. Recurrent major depressive disorder, in full remission (HCC)  Chronic stable condition  -Recommend continuation of behavioral health as scheduled with counselor  -Healthy lifestyle discussed ED precautions given unknown for exacerbation of this condition with any SI        Differential diagnosis, natural history, supportive care, and indications for immediate follow-up discussed.  Shared decision making approach utilized, and patient is amendable with plan of care.  Patient understands to return to clinic or go to the emergency department if symptoms worsen. All questions and concerns addressed to the best of my knowledge.    Return in about 6 months (around 11/18/2023).    Please note that this dictation was created using voice recognition software. I have made every reasonable attempt to correct obvious errors, but I expect that there are errors of grammar and possibly content that I did not discover before finalizing the note.

## 2023-05-31 ENCOUNTER — OFFICE VISIT (OUTPATIENT)
Dept: MEDICAL GROUP | Facility: PHYSICIAN GROUP | Age: 28
End: 2023-05-31
Payer: COMMERCIAL

## 2023-05-31 ENCOUNTER — HOSPITAL ENCOUNTER (OUTPATIENT)
Facility: MEDICAL CENTER | Age: 28
End: 2023-05-31
Payer: COMMERCIAL

## 2023-05-31 VITALS
WEIGHT: 115.2 LBS | HEART RATE: 64 BPM | OXYGEN SATURATION: 98 % | BODY MASS INDEX: 18.51 KG/M2 | DIASTOLIC BLOOD PRESSURE: 60 MMHG | TEMPERATURE: 98 F | HEIGHT: 66 IN | SYSTOLIC BLOOD PRESSURE: 110 MMHG

## 2023-05-31 DIAGNOSIS — N84.1 CERVICAL POLYP: ICD-10-CM

## 2023-05-31 DIAGNOSIS — Z01.419 WELL WOMAN EXAM: ICD-10-CM

## 2023-05-31 PROCEDURE — 87491 CHLMYD TRACH DNA AMP PROBE: CPT

## 2023-05-31 PROCEDURE — 88175 CYTOPATH C/V AUTO FLUID REDO: CPT

## 2023-05-31 PROCEDURE — 99395 PREV VISIT EST AGE 18-39: CPT

## 2023-05-31 PROCEDURE — 3078F DIAST BP <80 MM HG: CPT

## 2023-05-31 PROCEDURE — 87591 N.GONORRHOEAE DNA AMP PROB: CPT

## 2023-05-31 PROCEDURE — 3074F SYST BP LT 130 MM HG: CPT

## 2023-05-31 ASSESSMENT — FIBROSIS 4 INDEX: FIB4 SCORE: .504

## 2023-06-01 DIAGNOSIS — Z01.419 WELL WOMAN EXAM: ICD-10-CM

## 2023-06-01 NOTE — PROGRESS NOTES
Subjective:     CC:   Chief Complaint   Patient presents with    Annual Exam     PAP       HPI:   Dyllan Freeman is a 28 y.o. female who presents for annual exam    Patient has GYN provider: No   Last Pap Smear:    H/O Abnormal Pap: No  Last Mammogram: n/a  Last Bone Density Test: n/a  Last Colorectal Cancer Screening: n/a  Last Tdap: not UTD, patient refused  Received HPV series: Yes    Exercise: strenuous regular exercise, aerobic > 3 hours a week  Diet: eats healthy       No LMP recorded.  Hx STDs: Yes, genital herpes  Birth control: no  Menses every month with 3-4 days with moderate bleeding. LMP 23  Reports moderate cramping and does take OTC analgesics for cramps.  No significant bloating/fluid retention, pelvic pain, or dyspareunia. No abnormal vaginal discharge.  No breast tenderness, mass, nipple discharge, changes in size or contour, or abnormal cyclic discomfort.    OB History    Para Term  AB Living   0 0 0 0 0 0   SAB IAB Ectopic Molar Multiple Live Births   0 0 0 0 0 0      She  reports that she is not currently sexually active.    She  has a past medical history of Anemia, Depression, Major depressive disorder, recurrent episode, moderate (HCC) (10/4/2022), and Vitamin D deficiency.  She  has no past surgical history on file.    Family History   Problem Relation Age of Onset    Hypertension Mother     Diabetes Mother     Heart Disease Mother     No Known Problems Father     Stroke Maternal Grandfather     Stroke Paternal Grandmother     Dementia Paternal Grandfather      Social History     Tobacco Use    Smoking status: Never    Smokeless tobacco: Never   Vaping Use    Vaping Use: Never used   Substance Use Topics    Alcohol use: Not Currently    Drug use: Not Currently       Patient Active Problem List    Diagnosis Date Noted    Cervical polyp 2023    Female fertility problem 2023    COVID 2022    Paroxysmal SVT (supraventricular tachycardia) (HCC)  "10/13/2022    Generalized anxiety disorder 10/04/2022    Vitamin D deficiency 08/23/2022    Prolactin increased 07/26/2022    Increased heart rate 07/26/2022    Recurrent major depressive disorder, in full remission (HCC) 07/26/2022    Iron deficiency 06/02/2021     Current Outpatient Medications   Medication Sig Dispense Refill    PRENATAL VIT-FE FUMARATE-FA PO Take  by mouth.      Cholecalciferol (VITAMIN D3 PO) Take 1 Capsule by mouth every day.       No current facility-administered medications for this visit.     Allergies   Allergen Reactions    Iodine Contrast [[employer]] Anaphylaxis       Review of Systems   Constitutional: Negative for fever, chills and malaise/fatigue.   HENT: Negative for congestion.    Eyes: Negative for pain.   Respiratory: Negative for cough and shortness of breath.    Cardiovascular: Negative for chest pain and leg swelling.   Gastrointestinal: Negative for nausea, vomiting, abdominal pain and diarrhea.   Genitourinary: Negative for dysuria and hematuria.   Skin: Negative for rash.   Neurological: Negative for dizziness, focal weakness and headaches.   Endo/Heme/Allergies: Does not bruise/bleed easily.   Psychiatric/Behavioral: Negative for depression.  The patient is not nervous/anxious.      Objective:   /60 (BP Location: Left arm, Patient Position: Sitting, BP Cuff Size: Adult)   Pulse 64   Temp 36.7 °C (98 °F) (Temporal)   Ht 1.676 m (5' 6\")   Wt 52.3 kg (115 lb 3.2 oz)   SpO2 98%   BMI 18.59 kg/m²     Wt Readings from Last 4 Encounters:   05/31/23 52.3 kg (115 lb 3.2 oz)   05/18/23 52.1 kg (114 lb 12.8 oz)   12/29/22 54.4 kg (120 lb)   10/13/22 55.3 kg (122 lb)       A chaperone was offered to the patient during today's exam. Chaperone name: Sunitha (APRN student) was present.    Physical Exam:  Constitutional: Well-developed and well-nourished. Not diaphoretic. No distress.   Skin: Skin is warm and dry. No rash noted.  Head: Atraumatic without " lesions.  Eyes: Conjunctivae and extraocular motions are normal. Pupils are equal, round, and reactive to light. No scleral icterus.   Ears:  External ears unremarkable. Tympanic membranes clear and intact.  Nose: Nares patent. Septum midline. Turbinates without erythema nor edema. No discharge.   Mouth/Throat: Tongue normal. Oropharynx is clear and moist. Posterior pharynx without erythema or exudates.  Neck: Supple, trachea midline. Normal range of motion. No thyromegaly present. No lymphadenopathy--cervical or supraclavicular.  Cardiovascular: Regular rate and rhythm, S1 and S2 without murmur, rubs, or gallops.    Respiratory: Effort normal. Clear to auscultation throughout. No adventitious sounds.   Abdomen: Soft, non tender, and without distention. Active bowel sounds in all four quadrants. No rebound, guarding, masses or HSM.  : Perineum and external genitalia normal without rash. Vagina with bloody and thick discharge. Cervix without visible lesions or discharge. Bimanual exam without adnexal masses or cervical motion tenderness.Skin tag apx 1 cm attached to cervix.   Extremities: No cyanosis, clubbing, erythema, nor edema. Distal pulses intact and symmetric.   Musculoskeletal: All major joints AROM full in all directions without pain.  Neurological: Alert and oriented x 3. Grossly non-focal. Strength and sensation grossly intact. DTRs 2+/3 and symmetric.   Psychiatric:  Behavior, mood, and affect are appropriate.    Assessment and Plan:     1. Well woman exam  - Thinprep Rflx HPV ASC and above w/CTNG; Future    2. Cervical polyp  - Referral to OB/Gyn      Health maintenance:  Declines vaccines   Labs per orders  Immunizations per orders  Patient counseled about skin care, diet, supplements, and exercise.  Discussed  breast self exam, mammography screening, STD prevention, family planning choices, menopause, adequate intake of calcium and vitamin D, diet and exercise, prenatals     Follow-up: Return if  symptoms worsen or fail to improve.

## 2023-06-02 LAB
C TRACH DNA GENITAL QL NAA+PROBE: NEGATIVE
CYTOLOGY REG CYTOL: NORMAL
N GONORRHOEA DNA GENITAL QL NAA+PROBE: NEGATIVE
SPECIMEN SOURCE: NORMAL

## 2023-06-24 ENCOUNTER — HOSPITAL ENCOUNTER (OUTPATIENT)
Dept: LAB | Facility: MEDICAL CENTER | Age: 28
End: 2023-06-24
Payer: COMMERCIAL

## 2023-06-24 DIAGNOSIS — Z13.29 SCREENING FOR THYROID DISORDER: ICD-10-CM

## 2023-06-24 DIAGNOSIS — Z11.59 NEED FOR HEPATITIS C SCREENING TEST: ICD-10-CM

## 2023-06-24 DIAGNOSIS — E78.5 DYSLIPIDEMIA: ICD-10-CM

## 2023-06-24 DIAGNOSIS — N97.9 FEMALE FERTILITY PROBLEM: ICD-10-CM

## 2023-06-24 DIAGNOSIS — E55.9 VITAMIN D DEFICIENCY: ICD-10-CM

## 2023-06-24 LAB
25(OH)D3 SERPL-MCNC: 29 NG/ML (ref 30–100)
ALBUMIN SERPL BCP-MCNC: 4.4 G/DL (ref 3.2–4.9)
ALBUMIN/GLOB SERPL: 1.6 G/DL
ALP SERPL-CCNC: 69 U/L (ref 30–99)
ALT SERPL-CCNC: 15 U/L (ref 2–50)
ANION GAP SERPL CALC-SCNC: 12 MMOL/L (ref 7–16)
AST SERPL-CCNC: 15 U/L (ref 12–45)
BILIRUB SERPL-MCNC: 0.3 MG/DL (ref 0.1–1.5)
BUN SERPL-MCNC: 9 MG/DL (ref 8–22)
CALCIUM ALBUM COR SERPL-MCNC: 8.8 MG/DL (ref 8.5–10.5)
CALCIUM SERPL-MCNC: 9.1 MG/DL (ref 8.5–10.5)
CHLORIDE SERPL-SCNC: 104 MMOL/L (ref 96–112)
CHOLEST SERPL-MCNC: 166 MG/DL (ref 100–199)
CO2 SERPL-SCNC: 23 MMOL/L (ref 20–33)
CREAT SERPL-MCNC: 0.63 MG/DL (ref 0.5–1.4)
ERYTHROCYTE [DISTWIDTH] IN BLOOD BY AUTOMATED COUNT: 39.8 FL (ref 35.9–50)
FASTING STATUS PATIENT QL REPORTED: NORMAL
GFR SERPLBLD CREATININE-BSD FMLA CKD-EPI: 124 ML/MIN/1.73 M 2
GLOBULIN SER CALC-MCNC: 2.8 G/DL (ref 1.9–3.5)
GLUCOSE SERPL-MCNC: 88 MG/DL (ref 65–99)
HCT VFR BLD AUTO: 40 % (ref 37–47)
HCV AB SER QL: NORMAL
HDLC SERPL-MCNC: 36 MG/DL
HGB BLD-MCNC: 13.2 G/DL (ref 12–16)
LDLC SERPL CALC-MCNC: 105 MG/DL
MCH RBC QN AUTO: 28.4 PG (ref 27–33)
MCHC RBC AUTO-ENTMCNC: 33 G/DL (ref 32.2–35.5)
MCV RBC AUTO: 86 FL (ref 81.4–97.8)
PLATELET # BLD AUTO: 258 K/UL (ref 164–446)
PMV BLD AUTO: 13.2 FL (ref 9–12.9)
POTASSIUM SERPL-SCNC: 4.1 MMOL/L (ref 3.6–5.5)
PROT SERPL-MCNC: 7.2 G/DL (ref 6–8.2)
RBC # BLD AUTO: 4.65 M/UL (ref 4.2–5.4)
SODIUM SERPL-SCNC: 139 MMOL/L (ref 135–145)
TRIGL SERPL-MCNC: 125 MG/DL (ref 0–149)
TSH SERPL DL<=0.005 MIU/L-ACNC: 1.73 UIU/ML (ref 0.38–5.33)
WBC # BLD AUTO: 6 K/UL (ref 4.8–10.8)

## 2023-06-24 PROCEDURE — 84443 ASSAY THYROID STIM HORMONE: CPT

## 2023-06-24 PROCEDURE — 82306 VITAMIN D 25 HYDROXY: CPT

## 2023-06-24 PROCEDURE — 86803 HEPATITIS C AB TEST: CPT

## 2023-06-24 PROCEDURE — 36415 COLL VENOUS BLD VENIPUNCTURE: CPT

## 2023-06-24 PROCEDURE — 80053 COMPREHEN METABOLIC PANEL: CPT

## 2023-06-24 PROCEDURE — 80061 LIPID PANEL: CPT

## 2023-06-24 PROCEDURE — 85027 COMPLETE CBC AUTOMATED: CPT

## 2023-08-09 ENCOUNTER — OFFICE VISIT (OUTPATIENT)
Dept: MEDICAL GROUP | Facility: PHYSICIAN GROUP | Age: 28
End: 2023-08-09
Payer: COMMERCIAL

## 2023-08-09 VITALS
HEART RATE: 79 BPM | DIASTOLIC BLOOD PRESSURE: 60 MMHG | SYSTOLIC BLOOD PRESSURE: 100 MMHG | WEIGHT: 113.8 LBS | TEMPERATURE: 97.5 F | OXYGEN SATURATION: 94 % | HEIGHT: 66 IN | BODY MASS INDEX: 18.29 KG/M2

## 2023-08-09 DIAGNOSIS — R59.1 LYMPHADENOPATHY: ICD-10-CM

## 2023-08-09 DIAGNOSIS — E55.9 VITAMIN D DEFICIENCY: ICD-10-CM

## 2023-08-09 DIAGNOSIS — Z3A.01 LESS THAN 8 WEEKS GESTATION OF PREGNANCY: ICD-10-CM

## 2023-08-09 PROCEDURE — 3078F DIAST BP <80 MM HG: CPT

## 2023-08-09 PROCEDURE — 3074F SYST BP LT 130 MM HG: CPT

## 2023-08-09 PROCEDURE — 99214 OFFICE O/P EST MOD 30 MIN: CPT

## 2023-08-09 ASSESSMENT — PATIENT HEALTH QUESTIONNAIRE - PHQ9
4. FEELING TIRED OR HAVING LITTLE ENERGY: NOT AT ALL
8. MOVING OR SPEAKING SO SLOWLY THAT OTHER PEOPLE COULD HAVE NOTICED. OR THE OPPOSITE, BEING SO FIGETY OR RESTLESS THAT YOU HAVE BEEN MOVING AROUND A LOT MORE THAN USUAL: NOT AT ALL
2. FEELING DOWN, DEPRESSED, IRRITABLE, OR HOPELESS: NOT AT ALL
SUM OF ALL RESPONSES TO PHQ9 QUESTIONS 1 AND 2: 0
9. THOUGHTS THAT YOU WOULD BE BETTER OFF DEAD, OR OF HURTING YOURSELF: NOT AT ALL
3. TROUBLE FALLING OR STAYING ASLEEP OR SLEEPING TOO MUCH: NOT AT ALL
SUM OF ALL RESPONSES TO PHQ QUESTIONS 1-9: 0
5. POOR APPETITE OR OVEREATING: NOT AT ALL
6. FEELING BAD ABOUT YOURSELF - OR THAT YOU ARE A FAILURE OR HAVE LET YOURSELF OR YOUR FAMILY DOWN: NOT AL ALL
7. TROUBLE CONCENTRATING ON THINGS, SUCH AS READING THE NEWSPAPER OR WATCHING TELEVISION: NOT AT ALL
1. LITTLE INTEREST OR PLEASURE IN DOING THINGS: NOT AT ALL

## 2023-08-09 ASSESSMENT — ENCOUNTER SYMPTOMS
SHORTNESS OF BREATH: 0
CONSTIPATION: 0
COUGH: 0
WEAKNESS: 0
BLURRED VISION: 0
DIARRHEA: 0
CHILLS: 0
SORE THROAT: 0
WEIGHT LOSS: 0
DIZZINESS: 0
FEVER: 0
NAUSEA: 0
ABDOMINAL PAIN: 0
HEADACHES: 0
VOMITING: 0
MYALGIAS: 0

## 2023-08-09 ASSESSMENT — FIBROSIS 4 INDEX: FIB4 SCORE: 0.42

## 2023-08-09 NOTE — PROGRESS NOTES
"Subjective:     CC: neck swelling    HPI:   Dyllan presents today with    Problem   Lymphadenopathy    Patient reports one week of swollen lymph node under chin slightly right anterior. Tender to the touch. Had a dental cleaning one week prior to onset. Denies difficulty swallowing, voice change, fever, chills, sore throat, salivation changes. She reports to have had US in the past for concerning lymphadenopathy. She would like repeat US.      Less Than 8 Weeks Gestation of Pregnancy    LMP 7/2/2023  -Has been trying to actively conceive for 5 months  -It is suspected she is about 5 weeks pregnant with due date apx 4/7/2024  -Taking prenatals and omega daily  -Abstaining from alcohol/drugs  -Eating healthy  -Walking for exercise  -Needs to establish with OB     Vitamin D Deficiency    Chronic condition, active  Recently Started supplementing with prenatals which appears to have added Vit D.         Health Maintenance: defers    ROS:  Review of Systems   Constitutional:  Negative for chills, fever, malaise/fatigue and weight loss.   HENT:  Negative for congestion and sore throat.    Eyes:  Negative for blurred vision.   Respiratory:  Negative for cough and shortness of breath.    Cardiovascular:  Negative for chest pain.   Gastrointestinal:  Negative for abdominal pain, constipation, diarrhea, nausea and vomiting.   Musculoskeletal:  Negative for myalgias.   Neurological:  Negative for dizziness, weakness and headaches.       Objective:     Exam:  /60 (BP Location: Left arm, Patient Position: Sitting, BP Cuff Size: Adult)   Pulse 79   Temp 36.4 °C (97.5 °F) (Temporal)   Ht 1.676 m (5' 6\")   Wt 51.6 kg (113 lb 12.8 oz)   LMP 12/14/2022   SpO2 94%   BMI 18.37 kg/m²  Body mass index is 18.37 kg/m².    Physical Exam  Constitutional:       General: She is not in acute distress.     Appearance: Normal appearance. She is not ill-appearing or toxic-appearing.   HENT:      Head: Normocephalic.   Eyes:      " Conjunctiva/sclera: Conjunctivae normal.   Pulmonary:      Effort: Pulmonary effort is normal.   Lymphadenopathy:      Cervical: Cervical adenopathy present.   Skin:     General: Skin is warm and dry.   Neurological:      General: No focal deficit present.      Mental Status: She is alert and oriented to person, place, and time.   Psychiatric:         Mood and Affect: Mood normal.         Behavior: Behavior normal.         Labs:    Latest Reference Range & Units 06/24/23 07:15   WBC 4.8 - 10.8 K/uL 6.0   RBC 4.20 - 5.40 M/uL 4.65   Hemoglobin 12.0 - 16.0 g/dL 13.2   Hematocrit 37.0 - 47.0 % 40.0   MCV 81.4 - 97.8 fL 86.0   MCH 27.0 - 33.0 pg 28.4   MCHC 32.2 - 35.5 g/dL 33.0   RDW 35.9 - 50.0 fL 39.8   Platelet Count 164 - 446 K/uL 258   MPV 9.0 - 12.9 fL 13.2 (H)   Sodium 135 - 145 mmol/L 139   Potassium 3.6 - 5.5 mmol/L 4.1   Chloride 96 - 112 mmol/L 104   Co2 20 - 33 mmol/L 23   Anion Gap 7.0 - 16.0  12.0   Glucose 65 - 99 mg/dL 88   Bun 8 - 22 mg/dL 9   Creatinine 0.50 - 1.40 mg/dL 0.63   GFR (CKD-EPI) >60 mL/min/1.73 m 2 124   Calcium 8.5 - 10.5 mg/dL 9.1   Correct Calcium 8.5 - 10.5 mg/dL 8.8   AST(SGOT) 12 - 45 U/L 15   ALT(SGPT) 2 - 50 U/L 15   Alkaline Phosphatase 30 - 99 U/L 69   Total Bilirubin 0.1 - 1.5 mg/dL 0.3   Albumin 3.2 - 4.9 g/dL 4.4   Total Protein 6.0 - 8.2 g/dL 7.2   Globulin 1.9 - 3.5 g/dL 2.8   A-G Ratio g/dL 1.6   Fasting Status  Fasting   Cholesterol,Tot 100 - 199 mg/dL 166   Triglycerides 0 - 149 mg/dL 125   HDL >=40 mg/dL 36 !   LDL <100 mg/dL 105 (H)   25-Hydroxy   Vitamin D 25 30 - 100 ng/mL 29 (L)   TSH 0.380 - 5.330 uIU/mL 1.730   Hepatitis C Antibody Non-Reactive  Non-Reactive       Assessment & Plan: Medical Decision Making     28 y.o. female with the following -     Problem List Items Addressed This Visit       Vitamin D deficiency     Chronic, uncontrolled  Recommend continuation of Vit D3 at 3999-6748 IU daily         Lymphadenopathy     Acute condition, uncomplicated  US  ordered         Relevant Orders    US-SOFT TISSUES OF HEAD - NECK    Less than 8 weeks gestation of pregnancy     New condition, stable  -Recommend continuation of daily Prenatal vitamin and omega supplementation  -Avoid alcohol and recreational drugs  -Avoid use of NSAIDs such as ibuprofen or Naproxen. Tylenol is ok if needed for pain 325-500 mg every 8 hours if needed.  -Recommend contacting OB ASAP to establish care  -Recommend healthy diet and daily moderate exercise as discussed.            Differential diagnosis, natural history, supportive care, and indications for immediate follow-up discussed.  Shared decision making approach utilized, and patient is amendable with plan of care.  Patient understands to return to clinic or go to the emergency department if symptoms worsen. All questions and concerns addressed to the best of my knowledge.    Return if symptoms worsen or fail to improve.    Please note that this dictation was created using voice recognition software. I have made every reasonable attempt to correct obvious errors, but I expect that there are errors of grammar and possibly content that I did not discover before finalizing the note.    I spent a total of 31 minutes with record review, exam, communication with the patient, communication with other providers, and documentation of this encounter.

## 2023-08-09 NOTE — ASSESSMENT & PLAN NOTE
New condition, stable  -Recommend continuation of daily Prenatal vitamin and omega supplementation  -Avoid alcohol and recreational drugs  -Avoid use of NSAIDs such as ibuprofen or Naproxen. Tylenol is ok if needed for pain 325-500 mg every 8 hours if needed.  -Recommend contacting OB ASAP to establish care  -Recommend healthy diet and daily moderate exercise as discussed.

## 2023-08-30 ENCOUNTER — APPOINTMENT (OUTPATIENT)
Dept: RADIOLOGY | Facility: MEDICAL CENTER | Age: 28
End: 2023-08-30
Attending: STUDENT IN AN ORGANIZED HEALTH CARE EDUCATION/TRAINING PROGRAM
Payer: COMMERCIAL

## 2023-08-30 ENCOUNTER — OFFICE VISIT (OUTPATIENT)
Dept: URGENT CARE | Facility: CLINIC | Age: 28
End: 2023-08-30
Payer: COMMERCIAL

## 2023-08-30 ENCOUNTER — HOSPITAL ENCOUNTER (EMERGENCY)
Facility: MEDICAL CENTER | Age: 28
End: 2023-08-31
Attending: STUDENT IN AN ORGANIZED HEALTH CARE EDUCATION/TRAINING PROGRAM
Payer: COMMERCIAL

## 2023-08-30 VITALS
HEIGHT: 66 IN | DIASTOLIC BLOOD PRESSURE: 52 MMHG | BODY MASS INDEX: 17.36 KG/M2 | TEMPERATURE: 97.8 F | SYSTOLIC BLOOD PRESSURE: 96 MMHG | OXYGEN SATURATION: 98 % | HEART RATE: 69 BPM | WEIGHT: 108.03 LBS | RESPIRATION RATE: 16 BRPM

## 2023-08-30 VITALS
OXYGEN SATURATION: 98 % | DIASTOLIC BLOOD PRESSURE: 80 MMHG | HEIGHT: 66 IN | SYSTOLIC BLOOD PRESSURE: 123 MMHG | HEART RATE: 85 BPM | WEIGHT: 109.57 LBS | TEMPERATURE: 97.3 F | RESPIRATION RATE: 16 BRPM | BODY MASS INDEX: 17.61 KG/M2

## 2023-08-30 DIAGNOSIS — O21.9 NAUSEA AND VOMITING IN PREGNANCY: ICD-10-CM

## 2023-08-30 DIAGNOSIS — O21.0 HYPEREMESIS GRAVIDARUM: ICD-10-CM

## 2023-08-30 DIAGNOSIS — N30.00 ACUTE CYSTITIS WITHOUT HEMATURIA: ICD-10-CM

## 2023-08-30 LAB
ALBUMIN SERPL BCP-MCNC: 4.7 G/DL (ref 3.2–4.9)
ALBUMIN/GLOB SERPL: 1.5 G/DL
ALP SERPL-CCNC: 62 U/L (ref 30–99)
ALT SERPL-CCNC: 16 U/L (ref 2–50)
ANION GAP SERPL CALC-SCNC: 15 MMOL/L (ref 7–16)
APPEARANCE UR: ABNORMAL
AST SERPL-CCNC: 16 U/L (ref 12–45)
B-HCG SERPL-ACNC: ABNORMAL MIU/ML (ref 0–5)
BACTERIA #/AREA URNS HPF: ABNORMAL /HPF
BASOPHILS # BLD AUTO: 0.3 % (ref 0–1.8)
BASOPHILS # BLD: 0.03 K/UL (ref 0–0.12)
BILIRUB SERPL-MCNC: 0.5 MG/DL (ref 0.1–1.5)
BILIRUB UR QL STRIP.AUTO: NEGATIVE
BUN SERPL-MCNC: 6 MG/DL (ref 8–22)
CALCIUM ALBUM COR SERPL-MCNC: 9.1 MG/DL (ref 8.5–10.5)
CALCIUM SERPL-MCNC: 9.7 MG/DL (ref 8.5–10.5)
CHLORIDE SERPL-SCNC: 105 MMOL/L (ref 96–112)
CO2 SERPL-SCNC: 19 MMOL/L (ref 20–33)
COLOR UR: YELLOW
CREAT SERPL-MCNC: 0.47 MG/DL (ref 0.5–1.4)
EOSINOPHIL # BLD AUTO: 0.05 K/UL (ref 0–0.51)
EOSINOPHIL NFR BLD: 0.6 % (ref 0–6.9)
EPI CELLS #/AREA URNS HPF: ABNORMAL /HPF
ERYTHROCYTE [DISTWIDTH] IN BLOOD BY AUTOMATED COUNT: 39.9 FL (ref 35.9–50)
GFR SERPLBLD CREATININE-BSD FMLA CKD-EPI: 132 ML/MIN/1.73 M 2
GLOBULIN SER CALC-MCNC: 3.2 G/DL (ref 1.9–3.5)
GLUCOSE SERPL-MCNC: 90 MG/DL (ref 65–99)
GLUCOSE UR STRIP.AUTO-MCNC: NEGATIVE MG/DL
HCT VFR BLD AUTO: 41.6 % (ref 37–47)
HGB BLD-MCNC: 14.2 G/DL (ref 12–16)
HYALINE CASTS #/AREA URNS LPF: ABNORMAL /LPF
IMM GRANULOCYTES # BLD AUTO: 0.02 K/UL (ref 0–0.11)
IMM GRANULOCYTES NFR BLD AUTO: 0.2 % (ref 0–0.9)
KETONES UR STRIP.AUTO-MCNC: 80 MG/DL
LEUKOCYTE ESTERASE UR QL STRIP.AUTO: ABNORMAL
LIPASE SERPL-CCNC: 29 U/L (ref 11–82)
LYMPHOCYTES # BLD AUTO: 2.52 K/UL (ref 1–4.8)
LYMPHOCYTES NFR BLD: 27.7 % (ref 22–41)
MCH RBC QN AUTO: 28.2 PG (ref 27–33)
MCHC RBC AUTO-ENTMCNC: 34.1 G/DL (ref 32.2–35.5)
MCV RBC AUTO: 82.7 FL (ref 81.4–97.8)
MICRO URNS: ABNORMAL
MONOCYTES # BLD AUTO: 0.52 K/UL (ref 0–0.85)
MONOCYTES NFR BLD AUTO: 5.7 % (ref 0–13.4)
MUCOUS THREADS #/AREA URNS HPF: ABNORMAL /HPF
NEUTROPHILS # BLD AUTO: 5.95 K/UL (ref 1.82–7.42)
NEUTROPHILS NFR BLD: 65.5 % (ref 44–72)
NITRITE UR QL STRIP.AUTO: NEGATIVE
NRBC # BLD AUTO: 0 K/UL
NRBC BLD-RTO: 0 /100 WBC (ref 0–0.2)
NUMBER OF RH DOSES IND 8505RD: NORMAL
PH UR STRIP.AUTO: 6.5 [PH] (ref 5–8)
PLATELET # BLD AUTO: 325 K/UL (ref 164–446)
PMV BLD AUTO: 11.7 FL (ref 9–12.9)
POTASSIUM SERPL-SCNC: 4 MMOL/L (ref 3.6–5.5)
PROT SERPL-MCNC: 7.9 G/DL (ref 6–8.2)
PROT UR QL STRIP: NEGATIVE MG/DL
RBC # BLD AUTO: 5.03 M/UL (ref 4.2–5.4)
RBC # URNS HPF: ABNORMAL /HPF
RBC UR QL AUTO: ABNORMAL
RH BLD: NORMAL
SODIUM SERPL-SCNC: 139 MMOL/L (ref 135–145)
SP GR UR STRIP.AUTO: 1.01
UROBILINOGEN UR STRIP.AUTO-MCNC: 0.2 MG/DL
WBC # BLD AUTO: 9.1 K/UL (ref 4.8–10.8)
WBC #/AREA URNS HPF: ABNORMAL /HPF

## 2023-08-30 PROCEDURE — 84702 CHORIONIC GONADOTROPIN TEST: CPT

## 2023-08-30 PROCEDURE — 99285 EMERGENCY DEPT VISIT HI MDM: CPT

## 2023-08-30 PROCEDURE — 93005 ELECTROCARDIOGRAM TRACING: CPT | Performed by: STUDENT IN AN ORGANIZED HEALTH CARE EDUCATION/TRAINING PROGRAM

## 2023-08-30 PROCEDURE — 700105 HCHG RX REV CODE 258: Performed by: STUDENT IN AN ORGANIZED HEALTH CARE EDUCATION/TRAINING PROGRAM

## 2023-08-30 PROCEDURE — 76817 TRANSVAGINAL US OBSTETRIC: CPT

## 2023-08-30 PROCEDURE — 85025 COMPLETE CBC W/AUTO DIFF WBC: CPT

## 2023-08-30 PROCEDURE — 700111 HCHG RX REV CODE 636 W/ 250 OVERRIDE (IP): Performed by: STUDENT IN AN ORGANIZED HEALTH CARE EDUCATION/TRAINING PROGRAM

## 2023-08-30 PROCEDURE — 76801 OB US < 14 WKS SINGLE FETUS: CPT

## 2023-08-30 PROCEDURE — 80053 COMPREHEN METABOLIC PANEL: CPT

## 2023-08-30 PROCEDURE — 86901 BLOOD TYPING SEROLOGIC RH(D): CPT

## 2023-08-30 PROCEDURE — 700102 HCHG RX REV CODE 250 W/ 637 OVERRIDE(OP): Performed by: STUDENT IN AN ORGANIZED HEALTH CARE EDUCATION/TRAINING PROGRAM

## 2023-08-30 PROCEDURE — A9270 NON-COVERED ITEM OR SERVICE: HCPCS | Performed by: STUDENT IN AN ORGANIZED HEALTH CARE EDUCATION/TRAINING PROGRAM

## 2023-08-30 PROCEDURE — 93005 ELECTROCARDIOGRAM TRACING: CPT

## 2023-08-30 PROCEDURE — 36415 COLL VENOUS BLD VENIPUNCTURE: CPT

## 2023-08-30 PROCEDURE — 96374 THER/PROPH/DIAG INJ IV PUSH: CPT

## 2023-08-30 PROCEDURE — 96375 TX/PRO/DX INJ NEW DRUG ADDON: CPT

## 2023-08-30 PROCEDURE — 81001 URINALYSIS AUTO W/SCOPE: CPT

## 2023-08-30 PROCEDURE — 83690 ASSAY OF LIPASE: CPT

## 2023-08-30 PROCEDURE — 99999 PR NO CHARGE: CPT | Performed by: NURSE PRACTITIONER

## 2023-08-30 RX ORDER — GAUZE BANDAGE 2" X 2"
100 BANDAGE TOPICAL ONCE
Status: COMPLETED | OUTPATIENT
Start: 2023-08-30 | End: 2023-08-30

## 2023-08-30 RX ORDER — DIPHENHYDRAMINE HYDROCHLORIDE 50 MG/ML
25 INJECTION INTRAMUSCULAR; INTRAVENOUS ONCE
Status: COMPLETED | OUTPATIENT
Start: 2023-08-30 | End: 2023-08-30

## 2023-08-30 RX ORDER — PROCHLORPERAZINE EDISYLATE 5 MG/ML
10 INJECTION INTRAMUSCULAR; INTRAVENOUS ONCE
Status: COMPLETED | OUTPATIENT
Start: 2023-08-30 | End: 2023-08-30

## 2023-08-30 RX ORDER — CEPHALEXIN 500 MG/1
500 CAPSULE ORAL ONCE
Status: DISCONTINUED | OUTPATIENT
Start: 2023-08-30 | End: 2023-08-31 | Stop reason: HOSPADM

## 2023-08-30 RX ORDER — DEXTROSE, SODIUM CHLORIDE, SODIUM LACTATE, POTASSIUM CHLORIDE, AND CALCIUM CHLORIDE 5; .6; .31; .03; .02 G/100ML; G/100ML; G/100ML; G/100ML; G/100ML
INJECTION, SOLUTION INTRAVENOUS ONCE
Status: COMPLETED | OUTPATIENT
Start: 2023-08-30 | End: 2023-08-31

## 2023-08-30 RX ORDER — PYRIDOXINE HCL (VITAMIN B6) 50 MG
50 TABLET ORAL ONCE
Status: COMPLETED | OUTPATIENT
Start: 2023-08-30 | End: 2023-08-30

## 2023-08-30 RX ADMIN — PYRIDOXINE HCL TAB 50 MG 50 MG: 50 TAB at 22:55

## 2023-08-30 RX ADMIN — DIPHENHYDRAMINE HYDROCHLORIDE 25 MG: 50 INJECTION, SOLUTION INTRAMUSCULAR; INTRAVENOUS at 22:15

## 2023-08-30 RX ADMIN — PROCHLORPERAZINE EDISYLATE 10 MG: 5 INJECTION INTRAMUSCULAR; INTRAVENOUS at 22:11

## 2023-08-30 RX ADMIN — SODIUM CHLORIDE, SODIUM LACTATE, POTASSIUM CHLORIDE, CALCIUM CHLORIDE AND DEXTROSE MONOHYDRATE: 5; 600; 310; 30; 20 INJECTION, SOLUTION INTRAVENOUS at 22:11

## 2023-08-30 RX ADMIN — Medication 100 MG: at 22:15

## 2023-08-30 ASSESSMENT — FIBROSIS 4 INDEX
FIB4 SCORE: 0.42
FIB4 SCORE: 0.42

## 2023-08-30 ASSESSMENT — PAIN DESCRIPTION - PAIN TYPE: TYPE: ACUTE PAIN

## 2023-08-31 LAB — EKG IMPRESSION: NORMAL

## 2023-08-31 PROCEDURE — A9270 NON-COVERED ITEM OR SERVICE: HCPCS | Performed by: STUDENT IN AN ORGANIZED HEALTH CARE EDUCATION/TRAINING PROGRAM

## 2023-08-31 PROCEDURE — 87086 URINE CULTURE/COLONY COUNT: CPT

## 2023-08-31 PROCEDURE — 700102 HCHG RX REV CODE 250 W/ 637 OVERRIDE(OP): Performed by: STUDENT IN AN ORGANIZED HEALTH CARE EDUCATION/TRAINING PROGRAM

## 2023-08-31 RX ORDER — CEPHALEXIN 500 MG/1
500 CAPSULE ORAL 3 TIMES DAILY
Qty: 15 CAPSULE | Refills: 0 | Status: ACTIVE | OUTPATIENT
Start: 2023-08-31 | End: 2023-09-05

## 2023-08-31 RX ORDER — PROCHLORPERAZINE MALEATE 10 MG
5 TABLET ORAL EVERY 6 HOURS PRN
Qty: 30 TABLET | Refills: 3 | Status: SHIPPED | OUTPATIENT
Start: 2023-08-31

## 2023-08-31 RX ORDER — PYRIDOXINE HCL (VITAMIN B6) 50 MG
25 TABLET ORAL DAILY
Qty: 30 TABLET | Refills: 11 | Status: SHIPPED | OUTPATIENT
Start: 2023-08-31

## 2023-08-31 NOTE — ED TRIAGE NOTES
"Chief Complaint   Patient presents with    N/V     X2 weeks. Pt states she has been unable to keep anything down today. Three episodes of emesis today.     Throwing Up Blood     Pt threw up small amount of \"brown\" blood today.     Abdominal Pain     Upper abdominal \"burning\" pain x1 day.      Pt ambulatory to triage with above complaints. Pt is currently 8 weeks pregnant, first OB appointment is this Friday. Pt denies any medical history. Pt states upper abdominal pain is worse with indigestion/burping. Pt denies any chest pain and denies vaginal bleeding and pelvic pain.     Protocol ordered. Pt educated on triage process, placed back in lobby, and instructed to inform staff of any changes.     /67   Pulse 88   Temp 36.4 °C (97.6 °F) (Temporal)   Resp 16   Ht 1.676 m (5' 6\")   Wt 49.7 kg (109 lb 9.1 oz)   LMP 12/14/2022   SpO2 98%   BMI 17.68 kg/m²     "

## 2023-08-31 NOTE — ED PROVIDER NOTES
"ED Provider Note    CHIEF COMPLAINT  Chief Complaint   Patient presents with    N/V     X2 weeks. Pt states she has been unable to keep anything down today. Three episodes of emesis today.     Throwing Up Blood     Pt threw up small amount of \"brown\" blood today.     Abdominal Pain     Upper abdominal \"burning\" pain x1 day.        EXTERNAL RECORDS REVIEWED  Outpatient Notes patient seen by family practitioner today prior to arrival.  Presented with 10 pound weight loss, vomiting.  Noted to be 8 weeks pregnant.  She was referred to the emergency department for further evaluation.  Last menstrual period noted to be 7-2-23.    HPI/ROS  LIMITATION TO HISTORY   Select: : None      Dyllan Freeman is a 28 y.o. female who presents to the emergency department for evaluation of nausea and vomiting and abdominal pain.  Patient reports that she has 8 weeks pregnant with her last menstrual period 7-2-23.  This is her first pregnancy.  She states that for the last 3 weeks she has had daily nausea with little appetite as a result.  She has not taken any medications for this.  She states that today she began vomiting with multiple episodes of the day.  She has not been able to hold down any food or liquids throughout the day and states she feels very dehydrated.  She states that she gradually has began to develop epigastric and substernal burning chest discomfort.  She denies dizziness, lightheadedness, syncope, vaginal bleeding, dysuria.  She does report noticing a small streak of brown or dark red blood in her vomit.    PAST MEDICAL HISTORY   has a past medical history of Anemia, Depression, Major depressive disorder, recurrent episode, moderate (HCC) (10/4/2022), and Vitamin D deficiency.    SURGICAL HISTORY  patient denies any surgical history    FAMILY HISTORY  Family History   Problem Relation Age of Onset    Hypertension Mother     Diabetes Mother     Heart Disease Mother     No Known Problems Father     Stroke " "Maternal Grandfather     Stroke Paternal Grandmother     Dementia Paternal Grandfather        SOCIAL HISTORY  Social History     Tobacco Use    Smoking status: Never    Smokeless tobacco: Never   Vaping Use    Vaping Use: Never used   Substance and Sexual Activity    Alcohol use: Not Currently    Drug use: Not Currently    Sexual activity: Not Currently       CURRENT MEDICATIONS  Home Medications    **Home medications have not yet been reviewed for this encounter**         ALLERGIES  Allergies   Allergen Reactions    Iodine Contrast [Diagnostic X-Ray Materials] Anaphylaxis       PHYSICAL EXAM  VITAL SIGNS: /80   Pulse 85   Temp 36.3 °C (97.3 °F) (Temporal)   Resp 16   Ht 1.676 m (5' 6\")   Wt 49.7 kg (109 lb 9.1 oz)   LMP 12/14/2022   SpO2 98%   BMI 17.68 kg/m²    Constitutional: No acute distress  HEENT: Atraumatic, normocephalic, pupils are equal round reactive to light, nose normal, mouth shows dry mucous membranes  Neck: Supple, no JVD, no tracheal deviation  Cardiovascular: Regular rate and rhythm, no murmur, rub or gallop, 2+ pulses peripherally x4  Thorax & Lungs: No respiratory distress, no wheezes, rales or rhonchi, no chest wall tenderness.  GI: Soft, non-distended, non-tender, no rebound  Skin: Warm, dry, no acute rash or lesion  Musculoskeletal: Moving all extremities, no acute deformity, no edema, no tenderness  Neurologic: A&Ox3, at baseline mentation, cranial nerves II through XII are grossly intact, no sensory deficit, no ataxia  Psychiatric: Appropriate affect for situation at this time      DIAGNOSTIC STUDIES / PROCEDURES  EKG  I have independently interpreted this EKG  Results for orders placed or performed during the hospital encounter of 08/30/23   EKG   Result Value Ref Range    Report       [facility] Emergency Dept.    Test Date:  2023-08-30  Pt Name:    SHER CAMILO              Department: ER  MRN:        [id number]                      Room:  Gender:     " Female                       Technician: [id number]  :        1995                   Requested By:ER TRIAGE PROTOCOL  Order #:    [id number]                    Reading MD:    Measurements  Intervals                                Axis  Rate:       67                           P:          50  IN:         111                          QRS:        61  QRSD:       93                           T:          56  QT:         404  QTc:        427    Interpretive Statements  Sinus rhythm  Borderline short IN interval  No previous ECG available for comparison           LABS  Labs Reviewed   COMP METABOLIC PANEL - Abnormal; Notable for the following components:       Result Value    Co2 19 (*)     Bun 6 (*)     Creatinine 0.47 (*)     All other components within normal limits   HCG QUANTITATIVE - Abnormal; Notable for the following components:    Bhcg 042598.0 (*)     All other components within normal limits   URINALYSIS,CULTURE IF INDICATED - Abnormal; Notable for the following components:    Character Cloudy (*)     Ketones 80 (*)     Leukocyte Esterase Large (*)     Occult Blood Trace (*)     All other components within normal limits    Narrative:     Indication for culture:->Pregnant women: fever and/or  asymptomatic screening   URINE MICROSCOPIC (W/UA) - Abnormal; Notable for the following components:    WBC 20-50 (*)     RBC 2-5 (*)     Bacteria Few (*)     Epithelial Cells Moderate (*)     All other components within normal limits    Narrative:     Indication for culture:->Pregnant women: fever and/or  asymptomatic screening   CBC WITH DIFFERENTIAL   LIPASE   RH TYPE FOR RHOGAM FROM E.D.    Narrative:     Print Consent?->No   ESTIMATED GFR         RADIOLOGY  I have independently interpreted the diagnostic imaging associated with this visit and am waiting the final reading from the radiologist.   My preliminary interpretation is as follows:   Viable intrauterine pregnancy at 8 weeks 4 days gestation.    Radiologist  interpretation:   US-OB 1ST TRIMESTER SINGLE GEST Is the patient pregnant? Yes    (Results Pending)         COURSE & MEDICAL DECISION MAKING    ED Observation Status? Yes; I am placing the patient in to an observation status due to a diagnostic uncertainty as well as therapeutic intensity. Patient placed in observation status at 9:49 PM, 8/30/2023.     Observation plan is as follows: IV fluids, antiemetics, labs and reevaluation.    Upon Reevaluation, the patient's condition has: Improved; and will be discharged.    Patient discharged from ED Observation status at 12:33 AM (Time) 8/31/2023.  (Date).     INITIAL ASSESSMENT, COURSE AND PLAN  Care Narrative:     Patient presents to the emergency department for evaluation of nausea and vomiting and early first semester of pregnancy.  Suspect hyperemesis gravidarum.  Will evaluate for molar pregnancy, electrolyte abnormality, kidney injury.  Will provide IV fluids containing dextrose, thiamine and antiemetics.  Will obtain transvaginal ultrasound to screen for ectopic pregnancy given her abdominal pain.  This will also screen for molar pregnancy.    Patient's laboratory work-up with no significant leukocytosis or anemia.  Complete metabolic panel with slightly low CO2, otherwise normal electrolytes and no CYNDIE.  Suspect mild acidosis in the setting of starvation ketosis which we are treating with dextrose containing fluids, thiamine.  Urinalysis contaminated though with few bacteria and large leukocyte esterase.  Discussed with patient plan to treat for asymptomatic bacteriuria.  She would like to hold on antibiotics tonight but will be provided with a prescription and will discuss treatment with her outpatient team.  Her ultrasound was independently reviewed by me and demonstrates a viable intrauterine pregnancy.  No evidence of molar pregnancy.  Low concern for heterotopic as this was not an IVF pregnancy.    On recheck following antiemetics and fluids patient is  feeling much better.  She is tolerating p.o. liquids without any difficulty.  Plan to discharge with prescription for pyridoxine and Compazine for breakthrough nausea and vomiting.  She will follow-up with her obstetrician as scheduled in a few days.  Return precautions discussed and all questions answered and the patient was discharged stable condition.    HYDRATION: Based on the patient's presentation of Acute Vomiting and Dehydration the patient was given IV fluids. IV Hydration was used because oral hydration was not adequate alone. Upon recheck following hydration, the patient was improved.      ADDITIONAL PROBLEM LIST  Asymptomatic bacteriuria    DISPOSITION AND DISCUSSIONS  I have discussed management of the patient with the following physicians and ROCIO's: None    Discussion of management with other QHP or appropriate source(s): None     Escalation of care considered, and ultimately not performed:acute inpatient care management, however at this time, the patient is most appropriate for outpatient management    FINAL DIAGNOSIS  1. Acute cystitis without hematuria    2. Hyperemesis gravidarum           Electronically signed by: Marko Salvador M.D., 8/30/2023 9:37 PM

## 2023-08-31 NOTE — DISCHARGE INSTRUCTIONS
Please take Keflex 3 times a day for the next 5 days for treatment of your infection.  Take pyridoxine daily to help with nausea.  You can take this medication up to 3 times a day as well.  Take Compazine every 6 hours as needed for breakthrough nausea and vomiting.  Drink plenty of water.    Return to the ER with worse pain, inability to eat or drink anything, dizziness, lightheadedness, if you pass out or otherwise feeling worse.

## 2023-08-31 NOTE — PROGRESS NOTES
Patient is a very pleasant 28-year-old female who presents with inability to keep fluids or solids down over the past 24 hours as well as a 10 pound weight loss over the past 2 weeks.  She is currently 8 weeks pregnant.  She denies any vaginal bleeding but did have some lower back pain yesterday.  She also states that when she vomited today it was reddish brownish-maroon.  So there is concern if this is blood or not.  She is established with OB however does not have her first appointment until this Friday.  With this constellation of symptoms and her past medical history I have referred her to the emergency department for further evaluation of the symptoms.  She is agreeable to this and they will head to the [facility] by LOU.

## 2023-08-31 NOTE — ED NOTES
Pt discharged to home. Discharge paperwork provided. Education provided by ERP. Reinforced discharge instructions.  Pt was given follow up instructions and prescriptions.  Pt verbalized understanding of all instructions for discharge.   Patient went out of the ER ambulatory with steady gait., alert and oriented x 4, with all belongings.

## 2023-09-03 LAB
BACTERIA UR CULT: NORMAL
SIGNIFICANT IND 70042: NORMAL
SITE SITE: NORMAL
SOURCE SOURCE: NORMAL

## 2023-10-15 ENCOUNTER — HOSPITAL ENCOUNTER (EMERGENCY)
Facility: MEDICAL CENTER | Age: 28
End: 2023-10-15
Attending: EMERGENCY MEDICINE
Payer: COMMERCIAL

## 2023-10-15 VITALS
BODY MASS INDEX: 17.44 KG/M2 | SYSTOLIC BLOOD PRESSURE: 98 MMHG | WEIGHT: 108.03 LBS | RESPIRATION RATE: 16 BRPM | HEART RATE: 92 BPM | TEMPERATURE: 97.2 F | OXYGEN SATURATION: 100 % | DIASTOLIC BLOOD PRESSURE: 59 MMHG

## 2023-10-15 DIAGNOSIS — E86.0 DEHYDRATION: ICD-10-CM

## 2023-10-15 DIAGNOSIS — O21.9 NAUSEA AND VOMITING DURING PREGNANCY PRIOR TO 22 WEEKS GESTATION: ICD-10-CM

## 2023-10-15 DIAGNOSIS — K59.00 CONSTIPATION, UNSPECIFIED CONSTIPATION TYPE: ICD-10-CM

## 2023-10-15 LAB
ALBUMIN SERPL BCP-MCNC: 3.9 G/DL (ref 3.2–4.9)
ALBUMIN/GLOB SERPL: 1.3 G/DL
ALP SERPL-CCNC: 53 U/L (ref 30–99)
ALT SERPL-CCNC: 10 U/L (ref 2–50)
ANION GAP SERPL CALC-SCNC: 12 MMOL/L (ref 7–16)
APPEARANCE UR: CLEAR
AST SERPL-CCNC: 15 U/L (ref 12–45)
B-HCG SERPL-ACNC: ABNORMAL MIU/ML (ref 0–5)
BACTERIA #/AREA URNS HPF: NEGATIVE /HPF
BASOPHILS # BLD AUTO: 0.3 % (ref 0–1.8)
BASOPHILS # BLD: 0.02 K/UL (ref 0–0.12)
BILIRUB SERPL-MCNC: 0.3 MG/DL (ref 0.1–1.5)
BILIRUB UR QL STRIP.AUTO: NEGATIVE
BUN SERPL-MCNC: 5 MG/DL (ref 8–22)
CALCIUM ALBUM COR SERPL-MCNC: 9.3 MG/DL (ref 8.5–10.5)
CALCIUM SERPL-MCNC: 9.2 MG/DL (ref 8.5–10.5)
CHLORIDE SERPL-SCNC: 105 MMOL/L (ref 96–112)
CO2 SERPL-SCNC: 20 MMOL/L (ref 20–33)
COLOR UR: YELLOW
CREAT SERPL-MCNC: 0.37 MG/DL (ref 0.5–1.4)
EOSINOPHIL # BLD AUTO: 0.04 K/UL (ref 0–0.51)
EOSINOPHIL NFR BLD: 0.5 % (ref 0–6.9)
EPI CELLS #/AREA URNS HPF: ABNORMAL /HPF
ERYTHROCYTE [DISTWIDTH] IN BLOOD BY AUTOMATED COUNT: 41.5 FL (ref 35.9–50)
GFR SERPLBLD CREATININE-BSD FMLA CKD-EPI: 140 ML/MIN/1.73 M 2
GLOBULIN SER CALC-MCNC: 3.1 G/DL (ref 1.9–3.5)
GLUCOSE SERPL-MCNC: 105 MG/DL (ref 65–99)
GLUCOSE UR STRIP.AUTO-MCNC: NEGATIVE MG/DL
HCT VFR BLD AUTO: 38.7 % (ref 37–47)
HGB BLD-MCNC: 12.7 G/DL (ref 12–16)
HYALINE CASTS #/AREA URNS LPF: ABNORMAL /LPF
IMM GRANULOCYTES # BLD AUTO: 0.05 K/UL (ref 0–0.11)
IMM GRANULOCYTES NFR BLD AUTO: 0.6 % (ref 0–0.9)
KETONES UR STRIP.AUTO-MCNC: NEGATIVE MG/DL
LEUKOCYTE ESTERASE UR QL STRIP.AUTO: ABNORMAL
LIPASE SERPL-CCNC: 32 U/L (ref 11–82)
LYMPHOCYTES # BLD AUTO: 1.76 K/UL (ref 1–4.8)
LYMPHOCYTES NFR BLD: 22.1 % (ref 22–41)
MCH RBC QN AUTO: 27.7 PG (ref 27–33)
MCHC RBC AUTO-ENTMCNC: 32.8 G/DL (ref 32.2–35.5)
MCV RBC AUTO: 84.3 FL (ref 81.4–97.8)
MICRO URNS: ABNORMAL
MONOCYTES # BLD AUTO: 0.31 K/UL (ref 0–0.85)
MONOCYTES NFR BLD AUTO: 3.9 % (ref 0–13.4)
NEUTROPHILS # BLD AUTO: 5.8 K/UL (ref 1.82–7.42)
NEUTROPHILS NFR BLD: 72.6 % (ref 44–72)
NITRITE UR QL STRIP.AUTO: NEGATIVE
NRBC # BLD AUTO: 0 K/UL
NRBC BLD-RTO: 0 /100 WBC (ref 0–0.2)
NUMBER OF RH DOSES IND 8505RD: NORMAL
PH UR STRIP.AUTO: 7.5 [PH] (ref 5–8)
PLATELET # BLD AUTO: 252 K/UL (ref 164–446)
PMV BLD AUTO: 11.3 FL (ref 9–12.9)
POTASSIUM SERPL-SCNC: 4.3 MMOL/L (ref 3.6–5.5)
PROT SERPL-MCNC: 7 G/DL (ref 6–8.2)
PROT UR QL STRIP: NEGATIVE MG/DL
RBC # BLD AUTO: 4.59 M/UL (ref 4.2–5.4)
RBC # URNS HPF: ABNORMAL /HPF
RBC UR QL AUTO: NEGATIVE
RH BLD: NORMAL
SODIUM SERPL-SCNC: 137 MMOL/L (ref 135–145)
SP GR UR STRIP.AUTO: 1.01
UROBILINOGEN UR STRIP.AUTO-MCNC: 0.2 MG/DL
WBC # BLD AUTO: 8 K/UL (ref 4.8–10.8)
WBC #/AREA URNS HPF: ABNORMAL /HPF

## 2023-10-15 PROCEDURE — 700105 HCHG RX REV CODE 258: Performed by: EMERGENCY MEDICINE

## 2023-10-15 PROCEDURE — 80053 COMPREHEN METABOLIC PANEL: CPT

## 2023-10-15 PROCEDURE — 99284 EMERGENCY DEPT VISIT MOD MDM: CPT

## 2023-10-15 PROCEDURE — 84702 CHORIONIC GONADOTROPIN TEST: CPT

## 2023-10-15 PROCEDURE — 81001 URINALYSIS AUTO W/SCOPE: CPT

## 2023-10-15 PROCEDURE — 36415 COLL VENOUS BLD VENIPUNCTURE: CPT

## 2023-10-15 PROCEDURE — 85025 COMPLETE CBC W/AUTO DIFF WBC: CPT

## 2023-10-15 PROCEDURE — 83690 ASSAY OF LIPASE: CPT

## 2023-10-15 PROCEDURE — 86901 BLOOD TYPING SEROLOGIC RH(D): CPT

## 2023-10-15 RX ORDER — SODIUM CHLORIDE 9 MG/ML
1000 INJECTION, SOLUTION INTRAVENOUS ONCE
Status: COMPLETED | OUTPATIENT
Start: 2023-10-15 | End: 2023-10-15

## 2023-10-15 RX ADMIN — SODIUM CHLORIDE 1000 ML: 9 INJECTION, SOLUTION INTRAVENOUS at 12:36

## 2023-10-15 ASSESSMENT — FIBROSIS 4 INDEX: FIB4 SCORE: 0.34

## 2023-10-15 NOTE — ED NOTES
Discharge instructions given to pt including follow up with pcp or returning if no improvement of symptoms or to return if worse. Questions answered by RN. Denies any new complaints. Discharged w/stable vitals and able to ambulate to the lobby with steady gait.

## 2023-10-15 NOTE — ED PROVIDER NOTES
"ED Provider Note    CHIEF COMPLAINT  Chief Complaint   Patient presents with    Vomiting     Pt states, \"I've been throwing up bile water since this morning.\"    Constipation     Pt reports last BM was on 10/12.  Pt reports she is still passing gas    Abdominal Pain     Pt reports intermittent generalized abd pain  Pt is currently 14 wks       EXTERNAL RECORDS REVIEWED  Outpatient Notes reviewed office visit progress note dated 2023 by BRII Lopez.  Patient seen for difficulty tolerating oral intake and weight loss in pregnancy.  Patient referred to the emergency department.  Reviewed emergency department work-up including metabolic work-up and urinalysis consistent with UTI.  Pelvic ultrasound demonstrating intrauterine pregnancy at 8 weeks and 4 days.  Patient did well with IV fluids, written for antibiotic for bacteriuria.    HPI/ROS  LIMITATION TO HISTORY   Select: : None  OUTSIDE HISTORIAN(S):  Significant other comfortable plan of care    Dyllan Freeman is a 28 y.o. female who presents for evaluation of nausea and vomiting.  Patient relates she is  at 14 weeks gestation.  She notes starting this morning nausea, several episodes of yellow/green in appearance emesis.  No fever, endorses mild intermittent abdominal pain to periumbilical region without radiation to the back.  She is established with OB/GYN, she has had pelvic ultrasound confirming intrauterine pregnancy.  No vaginal bleeding today.  No fever.  Patient notes constipation, small hard bowel movements last BM 3 days ago, still able to pass gas.  Took Zofran at home which improved nausea, patient nonetheless concerned with regard to above symptoms and as such came to be assessed.    PAST MEDICAL HISTORY   has a past medical history of Anemia, Depression, Major depressive disorder, recurrent episode, moderate (HCC) (10/4/2022), and Vitamin D deficiency.    SURGICAL HISTORY  patient denies any surgical history    FAMILY " HISTORY  Family History   Problem Relation Age of Onset    Hypertension Mother     Diabetes Mother     Heart Disease Mother     No Known Problems Father     Stroke Maternal Grandfather     Stroke Paternal Grandmother     Dementia Paternal Grandfather        SOCIAL HISTORY  Social History     Tobacco Use    Smoking status: Never    Smokeless tobacco: Never   Vaping Use    Vaping Use: Never used   Substance and Sexual Activity    Alcohol use: Not Currently    Drug use: Not Currently    Sexual activity: Not Currently       CURRENT MEDICATIONS  Home Medications       Reviewed by Maria Elena Pettit R.N. (Registered Nurse) on 10/15/23 at 1104  Med List Status: Not Addressed     Medication Last Dose Status   OMEGA-3 FATTY ACIDS PO  Active   PRENATAL VIT-FE FUMARATE-FA PO  Active   prochlorperazine (COMPAZINE) 10 MG Tab  Active   pyridoxine (VITAMIN B-6) 50 MG Tab  Active                    ALLERGIES  Allergies   Allergen Reactions    Iodine Contrast [Diagnostic X-Ray Materials] Anaphylaxis       PHYSICAL EXAM  VITAL SIGNS: BP 98/57   Pulse 92   Temp 36.2 °C (97.2 °F) (Temporal)   Resp 16   Wt 49 kg (108 lb 0.4 oz)   LMP 07/02/2023 (Exact Date)   SpO2 100%   BMI 17.44 kg/m²    General: Alert, no acute distress  Skin: Warm, dry, normal for ethnicity  Head: Normocephalic, atraumatic  Neck: Trachea midline, no tenderness  Eye: PERRL, normal conjunctiva, sclera anicteric  ENMT: Oral mucosa dry, no pharyngeal erythema or exudate  Cardiovascular: Regular rate and rhythm, No murmur, Normal peripheral perfusion  Respiratory: Lungs CTA, respirations are non-labored, breath sounds are equal  Gastrointestinal: Soft, mild tenderness superior umbilical region, no guarding, rebound, no rigidity.  No tenderness at Orient's point, negative Wang sign, bowel sounds are mildly hypoactive.  Musculoskeletal: No swelling, no deformity  Neurological: Alert and oriented to person, place, time, and situation  Lymphatics: No  lymphadenopathy  Psychiatric: Cooperative, appropriate mood & affect     DIAGNOSTIC STUDIES / PROCEDURES      LABS  Results for orders placed or performed during the hospital encounter of 10/15/23   CBC WITH DIFFERENTIAL   Result Value Ref Range    WBC 8.0 4.8 - 10.8 K/uL    RBC 4.59 4.20 - 5.40 M/uL    Hemoglobin 12.7 12.0 - 16.0 g/dL    Hematocrit 38.7 37.0 - 47.0 %    MCV 84.3 81.4 - 97.8 fL    MCH 27.7 27.0 - 33.0 pg    MCHC 32.8 32.2 - 35.5 g/dL    RDW 41.5 35.9 - 50.0 fL    Platelet Count 252 164 - 446 K/uL    MPV 11.3 9.0 - 12.9 fL    Neutrophils-Polys 72.60 (H) 44.00 - 72.00 %    Lymphocytes 22.10 22.00 - 41.00 %    Monocytes 3.90 0.00 - 13.40 %    Eosinophils 0.50 0.00 - 6.90 %    Basophils 0.30 0.00 - 1.80 %    Immature Granulocytes 0.60 0.00 - 0.90 %    Nucleated RBC 0.00 0.00 - 0.20 /100 WBC    Neutrophils (Absolute) 5.80 1.82 - 7.42 K/uL    Lymphs (Absolute) 1.76 1.00 - 4.80 K/uL    Monos (Absolute) 0.31 0.00 - 0.85 K/uL    Eos (Absolute) 0.04 0.00 - 0.51 K/uL    Baso (Absolute) 0.02 0.00 - 0.12 K/uL    Immature Granulocytes (abs) 0.05 0.00 - 0.11 K/uL    NRBC (Absolute) 0.00 K/uL   COMP METABOLIC PANEL   Result Value Ref Range    Sodium 137 135 - 145 mmol/L    Potassium 4.3 3.6 - 5.5 mmol/L    Chloride 105 96 - 112 mmol/L    Co2 20 20 - 33 mmol/L    Anion Gap 12.0 7.0 - 16.0    Glucose 105 (H) 65 - 99 mg/dL    Bun 5 (L) 8 - 22 mg/dL    Creatinine 0.37 (L) 0.50 - 1.40 mg/dL    Calcium 9.2 8.5 - 10.5 mg/dL    Correct Calcium 9.3 8.5 - 10.5 mg/dL    AST(SGOT) 15 12 - 45 U/L    ALT(SGPT) 10 2 - 50 U/L    Alkaline Phosphatase 53 30 - 99 U/L    Total Bilirubin 0.3 0.1 - 1.5 mg/dL    Albumin 3.9 3.2 - 4.9 g/dL    Total Protein 7.0 6.0 - 8.2 g/dL    Globulin 3.1 1.9 - 3.5 g/dL    A-G Ratio 1.3 g/dL   LIPASE   Result Value Ref Range    Lipase 32 11 - 82 U/L   HCG QUANTITATIVE   Result Value Ref Range    [facility] [place] [address].0 (H) 0.0 - 5.0 mIU/mL   URINALYSIS,CULTURE IF INDICATED    Specimen: Urine   Result Value Ref  Range    Color Yellow     Character Clear     Specific Gravity 1.006 <1.035    Ph 7.5 5.0 - 8.0    Glucose Negative Negative mg/dL    Ketones Negative Negative mg/dL    Protein Negative Negative mg/dL    Bilirubin Negative Negative    Urobilinogen, Urine 0.2 Negative    Nitrite Negative Negative    Leukocyte Esterase Small (A) Negative    Occult Blood Negative Negative    Micro Urine Req Microscopic    RH Type for Rhogam from E.D.   Result Value Ref Range    Emergency Department Rh Typing POS     Number Of Rh Doses Indicated ZERO    ESTIMATED GFR   Result Value Ref Range    GFR (CKD-EPI) 140 >60 mL/min/1.73 m 2   URINE MICROSCOPIC (W/UA)   Result Value Ref Range    WBC 5-10 (A) /hpf    RBC 0-2 /hpf    Bacteria Negative None /hpf    Epithelial Cells Few /hpf    Hyaline Cast 0-2 /lpf   Mildly elevated serum glucose consistent with a nonfasting study, otherwise unremarkable, hCG consistent with dates    RADIOLOGY  I have independently performed the diagnostic imaging associated with this visit:  My interpretation is as follows: Bedside transabdominal ultrasound performed by myself demonstrates intrauterine pregnancy with fetal heart rate approximate 140 bpm, fetal movement appreciated, size compatible for dates.      COURSE & MEDICAL DECISION MAKING    ED Observation Status? Yes; I am placing the patient in to an observation status due to a diagnostic uncertainty as well as therapeutic intensity. Patient placed in observation status at 11:58 AM, 10/15/2023.     Observation plan is as follows: Patient treated with 1 L of normal saline.  She notes no vomiting after Zofran taken at home, will hold antiemetic at this time.  Metabolic work-up as well as hCG quantitative is drawn, will perform a bedside transabdominal pelvic ultrasound.  Differential diagnosis at this point includes but is not restricted to dehydration, gastritis, gastroenteritis, electrolyte derangement, pancreatitis    1312: Patient reassessed, doing well,  she has tolerated the initial 500 mL of her crystalloid bolus well.  Updated with reassuring studies, she remains in no acute distress.    Patient Vitals for the past 24 hrs:   BP Temp Temp src Pulse Resp SpO2 Weight   10/15/23 1352 -- 36.2 °C (97.2 °F) Temporal 92 16 100 % --   10/15/23 1305 98/57 -- -- 69 16 100 % --   10/15/23 1103 97/62 36.3 °C (97.3 °F) Temporal 92 16 99 % --   10/15/23 1100 -- -- -- -- -- -- 49 kg (108 lb 0.4 oz)        Upon Reevaluation, the patient's condition has: Improved; and will be discharged.    Patient discharged from ED Observation status at 1315 (Time) 10/15/23 (Date).     INITIAL ASSESSMENT, COURSE AND PLAN  Care Narrative: This patient is a very pleasant well in appearance 28-year-old G1, P0 at 14 weeks gestation who presents for evaluation of acute vomiting.  She also has been constipated for the last few days but is still passing gas.  Exam is reassuring, she does have bowel sounds albeit mildly hypoactive, the abdomen is nondistended, no peritoneal signs consistent with surgical abdomen.  Suspect likely gastritis.  Pelvic ultrasound demonstrates intrauterine pregnancy with normal heart rate and size compatible for dates.  Laboratory analyses demonstrate no evidence suggestive of hepatic dysfunction nor acute kidney injury, no evidence of pancreatitis.  She has no leukocytosis nor any peritoneal signs; reassuring exam as such not consistent with surgical abdominal process.  Patient doing well with IV fluids and has not vomited after Zofran.  Presentation most consistent with GERD/gastritis.  She already has Zofran for home.  As such no indication for inpatient management.  HYDRATION: Based on the patient's presentation of Acute Vomiting and Dehydration the patient was given IV fluids. IV Hydration was used because oral hydration was not as rapid as required. Upon recheck following hydration, the patient was doing better, heart rate improving from 92-69..      ADDITIONAL PROBLEM  LIST  Dehydration, vomiting in second trimester pregnancy  DISPOSITION AND DISCUSSIONS  I have discussed management of the patient with the following physicians and ROCIO's:  NA    Discussion of management with other QHP or appropriate source(s): None     Escalation of care considered, and ultimately not performed:NA    Barriers to care at this time, including but not limited to:  NA .     Decision tools and prescription drugs considered including, but not limited to:  SIRS criteria for sepsis not met .    The patient will return for new or worsening symptoms and is stable at the time of discharge.    DISPOSITION:  Patient will be discharged home in stable condition.    FOLLOW UP:  CHAYO Dominguez.[email or web address].  [address]  [place] [address]  [phone]    Schedule an appointment as soon as possible for a visit       Your OB/GYN            OUTPATIENT MEDICATIONS:  New Prescriptions    PSYLLIUM (METAMUCIL) 28 % PACKET    Take 1 Packet by mouth 2 times a day for 14 days.          FINAL DIAGNOSIS  1. Nausea and vomiting during pregnancy prior to 22 weeks gestation    2. Dehydration    3. Constipation, unspecified constipation type           Electronically signed by: Clau Cadena M.D., 10/15/2023 11:47 AM

## 2023-10-15 NOTE — ED NOTES
Urine sample collected.   [facility] Counseling:  I discussed with the patient the risks of imiquimod including but not limited to erythema, scaling, itching, weeping, crusting, and pain.  Patient understands that the inflammatory response to imiquimod is variable from person to person and was educated regarded proper titration schedule.  If flu-like symptoms develop, patient knows to discontinue the medication and contact us.

## 2023-10-15 NOTE — ED TRIAGE NOTES
"Dyllan Samantha Pete  28 y.o. female  Chief Complaint   Patient presents with    Vomiting     Pt states, \"I've been throwing up bile water since this morning.\"    Constipation     Pt reports last BM was on 10/12.  Pt reports she is still passing gas    Abdominal Pain     Pt reports intermittent generalized abd pain  Pt is currently 14 wks       Pt amb to triage with steady gait for above complaint.   Patient's last menstrual period was 2023 (exact date).  Estimated Date of Delivery: 24  Pt is   Pt is alert and oriented, speaking in full sentences, follows commands and responds appropriately to questions. Not in any apparent distress. Respirations are even and unlabored.  Pt placed in lobby. Pt educated on triage process. Pt encouraged to alert staff for any changes.    "

## 2024-01-07 ENCOUNTER — HOSPITAL ENCOUNTER (EMERGENCY)
Facility: MEDICAL CENTER | Age: 29
End: 2024-01-07
Attending: OBSTETRICS & GYNECOLOGY | Admitting: OBSTETRICS & GYNECOLOGY
Payer: COMMERCIAL

## 2024-01-07 VITALS
HEIGHT: 66 IN | TEMPERATURE: 97.4 F | SYSTOLIC BLOOD PRESSURE: 113 MMHG | BODY MASS INDEX: 20.2 KG/M2 | HEART RATE: 85 BPM | WEIGHT: 125.66 LBS | DIASTOLIC BLOOD PRESSURE: 57 MMHG | RESPIRATION RATE: 16 BRPM | OXYGEN SATURATION: 100 %

## 2024-01-07 PROCEDURE — 99284 EMERGENCY DEPT VISIT MOD MDM: CPT

## 2024-01-07 ASSESSMENT — PAIN SCALES - GENERAL: PAINLEVEL: 0 - NO PAIN

## 2024-01-07 ASSESSMENT — FIBROSIS 4 INDEX: FIB4 SCORE: .5270462766947298886

## 2024-01-07 NOTE — ED PROVIDER NOTES
DATE OF SERVICE:  2024     OB ED NOTE     The patient is a 28-year-old female  1, para 0 at 27 and 0/7th weeks'   gestational age.  She presented to labor and delivery this evening with   complaints of decreased fetal movement.  She denied any leakage of fluid or   vaginal bleeding.  She was observed in triage and an NST was performed.  NST   was a category 1 reactive tracing.  She had no uterine contractions noted.    After hydrating in triage, she stated that she was feeling the baby moving.    She then desired to be discharged home.  She was discharged home in a stable   status.  She already has an appointment to follow up with Dr. Pillai in our   office.        ______________________________  Corinne E. Capurro, MD [facility]    DD:  2024 05:08  DT:  2024 05:37    Job#:  [id number]

## 2024-01-07 NOTE — PROGRESS NOTES
EDC 2024 27w0d    Patient presents to L&D triage unit reporting decreased FM today. Patient denies LOF/VB. States some cramping but no regular contractions. EFM monitors applied and tracing. VSS. FOB at bedside. Call light at bedside, patient instructed on use. Questions answered at this time.    0138: RN to bedside, patient states she is feeling FM now.     0150: Dr. Johnson called, MD to come to bedside.     0155: Dr. Johnson to bedside. Discharge order received. PTL precautions gone over with patient, including to return for contractions, LOF, VB, or a decrease in FM. Hydration enforced. Patient to continue with scheduled prenatal appointments. Patient verbalized understanding. Ambulated out in stable condition, FOB at side, belongings in hand.    (4) excellent

## 2024-03-06 ENCOUNTER — ANCILLARY PROCEDURE (OUTPATIENT)
Dept: MATERNAL FETAL MEDICINE | Facility: MEDICAL CENTER | Age: 29
End: 2024-03-06
Attending: OBSTETRICS & GYNECOLOGY
Payer: COMMERCIAL

## 2024-03-06 VITALS — SYSTOLIC BLOOD PRESSURE: 106 MMHG | BODY MASS INDEX: 23.29 KG/M2 | WEIGHT: 144.3 LBS | DIASTOLIC BLOOD PRESSURE: 65 MMHG

## 2024-03-06 DIAGNOSIS — Z3A.35 35 WEEKS GESTATION OF PREGNANCY: ICD-10-CM

## 2024-03-06 DIAGNOSIS — O35.01X1: ICD-10-CM

## 2024-03-06 PROCEDURE — 76817 TRANSVAGINAL US OBSTETRIC: CPT | Performed by: OBSTETRICS & GYNECOLOGY

## 2024-03-06 PROCEDURE — 99203 OFFICE O/P NEW LOW 30 MIN: CPT | Performed by: OBSTETRICS & GYNECOLOGY

## 2024-03-06 PROCEDURE — 76811 OB US DETAILED SNGL FETUS: CPT | Performed by: OBSTETRICS & GYNECOLOGY

## 2024-03-06 RX ORDER — IRON FUM,PS/FOLIC/BCOMP,C NO.9 125 MG-1MG
CAPSULE ORAL
COMMUNITY

## 2024-03-06 ASSESSMENT — FIBROSIS 4 INDEX: FIB4 SCORE: 0.55

## 2024-04-07 ENCOUNTER — HOSPITAL ENCOUNTER (INPATIENT)
Facility: MEDICAL CENTER | Age: 29
LOS: 2 days | End: 2024-04-10
Attending: OBSTETRICS & GYNECOLOGY | Admitting: OBSTETRICS & GYNECOLOGY
Payer: COMMERCIAL

## 2024-04-07 PROCEDURE — 302449 STATCHG TRIAGE ONLY (STATISTIC)

## 2024-04-07 ASSESSMENT — FIBROSIS 4 INDEX: FIB4 SCORE: 0.55

## 2024-04-08 LAB
BASOPHILS # BLD AUTO: 0.3 % (ref 0–1.8)
BASOPHILS # BLD: 0.04 K/UL (ref 0–0.12)
EOSINOPHIL # BLD AUTO: 0.12 K/UL (ref 0–0.51)
EOSINOPHIL NFR BLD: 1 % (ref 0–6.9)
ERYTHROCYTE [DISTWIDTH] IN BLOOD BY AUTOMATED COUNT: 53.9 FL (ref 35.9–50)
ERYTHROCYTE [DISTWIDTH] IN BLOOD BY AUTOMATED COUNT: 54.4 FL (ref 35.9–50)
HCT VFR BLD AUTO: 34.3 % (ref 37–47)
HCT VFR BLD AUTO: 38.5 % (ref 37–47)
HGB BLD-MCNC: 11.5 G/DL (ref 12–16)
HGB BLD-MCNC: 13.2 G/DL (ref 12–16)
HOLDING TUBE BB 8507: NORMAL
IMM GRANULOCYTES # BLD AUTO: 0.27 K/UL (ref 0–0.11)
IMM GRANULOCYTES NFR BLD AUTO: 2.3 % (ref 0–0.9)
LYMPHOCYTES # BLD AUTO: 2.18 K/UL (ref 1–4.8)
LYMPHOCYTES NFR BLD: 18.9 % (ref 22–41)
MCH RBC QN AUTO: 29 PG (ref 27–33)
MCH RBC QN AUTO: 29.2 PG (ref 27–33)
MCHC RBC AUTO-ENTMCNC: 33.5 G/DL (ref 32.2–35.5)
MCHC RBC AUTO-ENTMCNC: 34.3 G/DL (ref 32.2–35.5)
MCV RBC AUTO: 85.2 FL (ref 81.4–97.8)
MCV RBC AUTO: 86.4 FL (ref 81.4–97.8)
MONOCYTES # BLD AUTO: 1.02 K/UL (ref 0–0.85)
MONOCYTES NFR BLD AUTO: 8.8 % (ref 0–13.4)
NEUTROPHILS # BLD AUTO: 7.92 K/UL (ref 1.82–7.42)
NEUTROPHILS NFR BLD: 68.7 % (ref 44–72)
NRBC # BLD AUTO: 0 K/UL
NRBC BLD-RTO: 0 /100 WBC (ref 0–0.2)
PLATELET # BLD AUTO: 186 K/UL (ref 164–446)
PLATELET # BLD AUTO: 194 K/UL (ref 164–446)
PMV BLD AUTO: 11.5 FL (ref 9–12.9)
PMV BLD AUTO: 11.7 FL (ref 9–12.9)
RBC # BLD AUTO: 3.97 M/UL (ref 4.2–5.4)
RBC # BLD AUTO: 4.52 M/UL (ref 4.2–5.4)
T PALLIDUM AB SER QL IA: NORMAL
WBC # BLD AUTO: 11.6 K/UL (ref 4.8–10.8)
WBC # BLD AUTO: 13.2 K/UL (ref 4.8–10.8)

## 2024-04-08 PROCEDURE — 770002 HCHG ROOM/CARE - OB PRIVATE (112)

## 2024-04-08 PROCEDURE — 59409 OBSTETRICAL CARE: CPT

## 2024-04-08 PROCEDURE — 0KQM0ZZ REPAIR PERINEUM MUSCLE, OPEN APPROACH: ICD-10-PCS | Performed by: OBSTETRICS & GYNECOLOGY

## 2024-04-08 PROCEDURE — 304965 HCHG RECOVERY SERVICES

## 2024-04-08 PROCEDURE — 85025 COMPLETE CBC W/AUTO DIFF WBC: CPT

## 2024-04-08 PROCEDURE — 36415 COLL VENOUS BLD VENIPUNCTURE: CPT

## 2024-04-08 PROCEDURE — 700101 HCHG RX REV CODE 250: Performed by: OBSTETRICS & GYNECOLOGY

## 2024-04-08 PROCEDURE — 700111 HCHG RX REV CODE 636 W/ 250 OVERRIDE (IP): Performed by: OBSTETRICS & GYNECOLOGY

## 2024-04-08 PROCEDURE — A9270 NON-COVERED ITEM OR SERVICE: HCPCS | Performed by: OBSTETRICS & GYNECOLOGY

## 2024-04-08 PROCEDURE — 85027 COMPLETE CBC AUTOMATED: CPT

## 2024-04-08 PROCEDURE — 86780 TREPONEMA PALLIDUM: CPT

## 2024-04-08 PROCEDURE — 700102 HCHG RX REV CODE 250 W/ 637 OVERRIDE(OP): Performed by: OBSTETRICS & GYNECOLOGY

## 2024-04-08 PROCEDURE — 700105 HCHG RX REV CODE 258: Performed by: OBSTETRICS & GYNECOLOGY

## 2024-04-08 RX ORDER — TERBUTALINE SULFATE 1 MG/ML
0.25 INJECTION, SOLUTION SUBCUTANEOUS
Status: DISCONTINUED | OUTPATIENT
Start: 2024-04-08 | End: 2024-04-08 | Stop reason: HOSPADM

## 2024-04-08 RX ORDER — DOCUSATE SODIUM 100 MG/1
100 CAPSULE, LIQUID FILLED ORAL 2 TIMES DAILY PRN
Status: DISCONTINUED | OUTPATIENT
Start: 2024-04-08 | End: 2024-04-10 | Stop reason: HOSPADM

## 2024-04-08 RX ORDER — ACETAMINOPHEN 500 MG
1000 TABLET ORAL
Status: COMPLETED | OUTPATIENT
Start: 2024-04-08 | End: 2024-04-08

## 2024-04-08 RX ORDER — LIDOCAINE HYDROCHLORIDE 10 MG/ML
20 INJECTION, SOLUTION INFILTRATION; PERINEURAL
Status: COMPLETED | OUTPATIENT
Start: 2024-04-08 | End: 2024-04-08

## 2024-04-08 RX ORDER — VITAMIN A ACETATE, BETA CAROTENE, ASCORBIC ACID, CHOLECALCIFEROL, .ALPHA.-TOCOPHEROL ACETATE, DL-, THIAMINE MONONITRATE, RIBOFLAVIN, NIACINAMIDE, PYRIDOXINE HYDROCHLORIDE, FOLIC ACID, CYANOCOBALAMIN, CALCIUM CARBONATE, FERROUS FUMARATE, ZINC OXIDE, CUPRIC OXIDE 3080; 12; 120; 400; 1; 1.84; 3; 20; 22; 920; 25; 200; 27; 10; 2 [IU]/1; UG/1; MG/1; [IU]/1; MG/1; MG/1; MG/1; MG/1; MG/1; [IU]/1; MG/1; MG/1; MG/1; MG/1; MG/1
1 TABLET, FILM COATED ORAL EVERY MORNING
Status: DISCONTINUED | OUTPATIENT
Start: 2024-04-08 | End: 2024-04-10 | Stop reason: HOSPADM

## 2024-04-08 RX ORDER — MISOPROSTOL 200 UG/1
800 TABLET ORAL
Status: DISCONTINUED | OUTPATIENT
Start: 2024-04-08 | End: 2024-04-08 | Stop reason: HOSPADM

## 2024-04-08 RX ORDER — OXYTOCIN 10 [USP'U]/ML
10 INJECTION, SOLUTION INTRAMUSCULAR; INTRAVENOUS
Status: DISCONTINUED | OUTPATIENT
Start: 2024-04-08 | End: 2024-04-08 | Stop reason: HOSPADM

## 2024-04-08 RX ORDER — MISOPROSTOL 200 UG/1
600 TABLET ORAL
Status: DISCONTINUED | OUTPATIENT
Start: 2024-04-08 | End: 2024-04-10 | Stop reason: HOSPADM

## 2024-04-08 RX ORDER — ACETAMINOPHEN 500 MG
1000 TABLET ORAL EVERY 6 HOURS PRN
Status: DISCONTINUED | OUTPATIENT
Start: 2024-04-08 | End: 2024-04-10 | Stop reason: HOSPADM

## 2024-04-08 RX ORDER — OXYCODONE HYDROCHLORIDE 5 MG/1
5 TABLET ORAL EVERY 4 HOURS PRN
Status: DISCONTINUED | OUTPATIENT
Start: 2024-04-08 | End: 2024-04-10 | Stop reason: HOSPADM

## 2024-04-08 RX ORDER — CALCIUM CARBONATE 500 MG/1
1000 TABLET, CHEWABLE ORAL EVERY 6 HOURS PRN
Status: DISCONTINUED | OUTPATIENT
Start: 2024-04-08 | End: 2024-04-10 | Stop reason: HOSPADM

## 2024-04-08 RX ORDER — ONDANSETRON 2 MG/ML
4 INJECTION INTRAMUSCULAR; INTRAVENOUS EVERY 6 HOURS PRN
Status: DISCONTINUED | OUTPATIENT
Start: 2024-04-08 | End: 2024-04-08 | Stop reason: HOSPADM

## 2024-04-08 RX ORDER — IBUPROFEN 800 MG/1
800 TABLET ORAL
Status: DISCONTINUED | OUTPATIENT
Start: 2024-04-08 | End: 2024-04-08 | Stop reason: HOSPADM

## 2024-04-08 RX ORDER — METHYLERGONOVINE MALEATE 0.2 MG/ML
0.2 INJECTION INTRAVENOUS
Status: DISCONTINUED | OUTPATIENT
Start: 2024-04-08 | End: 2024-04-08 | Stop reason: HOSPADM

## 2024-04-08 RX ORDER — ONDANSETRON 4 MG/1
4 TABLET, ORALLY DISINTEGRATING ORAL EVERY 6 HOURS PRN
Status: DISCONTINUED | OUTPATIENT
Start: 2024-04-08 | End: 2024-04-08 | Stop reason: HOSPADM

## 2024-04-08 RX ORDER — SIMETHICONE 125 MG
125 TABLET,CHEWABLE ORAL 4 TIMES DAILY PRN
Status: DISCONTINUED | OUTPATIENT
Start: 2024-04-08 | End: 2024-04-10 | Stop reason: HOSPADM

## 2024-04-08 RX ORDER — SODIUM CHLORIDE, SODIUM LACTATE, POTASSIUM CHLORIDE, CALCIUM CHLORIDE 600; 310; 30; 20 MG/100ML; MG/100ML; MG/100ML; MG/100ML
INJECTION, SOLUTION INTRAVENOUS PRN
Status: DISCONTINUED | OUTPATIENT
Start: 2024-04-08 | End: 2024-04-10 | Stop reason: HOSPADM

## 2024-04-08 RX ORDER — SODIUM CHLORIDE, SODIUM LACTATE, POTASSIUM CHLORIDE, CALCIUM CHLORIDE 600; 310; 30; 20 MG/100ML; MG/100ML; MG/100ML; MG/100ML
INJECTION, SOLUTION INTRAVENOUS CONTINUOUS
Status: DISCONTINUED | OUTPATIENT
Start: 2024-04-08 | End: 2024-04-10 | Stop reason: HOSPADM

## 2024-04-08 RX ORDER — IBUPROFEN 800 MG/1
800 TABLET ORAL EVERY 8 HOURS PRN
Status: DISCONTINUED | OUTPATIENT
Start: 2024-04-08 | End: 2024-04-10 | Stop reason: HOSPADM

## 2024-04-08 RX ADMIN — OXYTOCIN 999 ML/HR: 10 INJECTION, SOLUTION INTRAMUSCULAR; INTRAVENOUS at 04:52

## 2024-04-08 RX ADMIN — LIDOCAINE HYDROCHLORIDE 20 ML: 10 INJECTION, SOLUTION INFILTRATION; PERINEURAL at 04:54

## 2024-04-08 RX ADMIN — LIDOCAINE HYDROCHLORIDE 20 ML: 10 INJECTION, SOLUTION INFILTRATION; PERINEURAL at 04:37

## 2024-04-08 RX ADMIN — IBUPROFEN 800 MG: 800 TABLET, FILM COATED ORAL at 17:35

## 2024-04-08 RX ADMIN — OXYTOCIN 125 ML/HR: 10 INJECTION, SOLUTION INTRAMUSCULAR; INTRAVENOUS at 08:12

## 2024-04-08 RX ADMIN — DOCUSATE SODIUM 100 MG: 100 CAPSULE, LIQUID FILLED ORAL at 17:35

## 2024-04-08 RX ADMIN — ACETAMINOPHEN 1000 MG: 500 TABLET, FILM COATED ORAL at 07:10

## 2024-04-08 ASSESSMENT — LIFESTYLE VARIABLES
TOTAL SCORE: 0
CONSUMPTION TOTAL: INCOMPLETE
TOTAL SCORE: 0
HAVE YOU EVER FELT YOU SHOULD CUT DOWN ON YOUR DRINKING: NO
EVER FELT BAD OR GUILTY ABOUT YOUR DRINKING: NO
HAVE PEOPLE ANNOYED YOU BY CRITICIZING YOUR DRINKING: NO
EVER_SMOKED: NEVER
TOTAL SCORE: 0
EVER HAD A DRINK FIRST THING IN THE MORNING TO STEADY YOUR NERVES TO GET RID OF A HANGOVER: NO
ALCOHOL_USE: NO

## 2024-04-08 ASSESSMENT — PAIN DESCRIPTION - PAIN TYPE
TYPE: ACUTE PAIN

## 2024-04-08 ASSESSMENT — PATIENT HEALTH QUESTIONNAIRE - PHQ9
SUM OF ALL RESPONSES TO PHQ9 QUESTIONS 1 AND 2: 0
2. FEELING DOWN, DEPRESSED, IRRITABLE, OR HOPELESS: NOT AT ALL
1. LITTLE INTEREST OR PLEASURE IN DOING THINGS: NOT AT ALL

## 2024-04-08 NOTE — DISCHARGE PLANNING
Discharge Planning Assessment Post Partum    Reason for Referral: History of anxiety and depression   Address: [address] [place] [address] [place] [address]  Phone: [phone]  Type of Living Situation: stable housing   Mom Diagnosis: Pregnancy, vaginal delivery   Baby Diagnosis: [place]-40.1 weeks   Primary Language: English     Name of Baby: Still deciding on her name (: 24)  Father of the Baby: Paulie Andino   Involved in baby’s care? Yes  Contact Information: [phone]    Prenatal Care: Yes-Dr. Pillai   Mom's PCP: Viral Petersen DNP  PCP for new baby: Dr. Lin     Support System: FOB   Coping/Bonding between mother & baby: Yes  Source of Feeding: breast feeding   Supplies for Infant: prepared for infant; denies any needs    Mom's Insurance: [employer]   Baby Covered on Insurance:Yes  Mother Employed/School:    Other children in the home/names & ages: first baby     Financial Hardship/Income: No   Mom's Mental status: alert and oriented    Services used prior to admit: None     CPS History: No  Psychiatric History: history of anxiety and depression.  MOB states it was when she was a teenager and denies any current symptoms.  Domestic Violence History: No  Drug/ETOH History: No    Resources Provided: post partum support and counseling resources and a children and family resource list  Referrals Made: None      Clearance for Discharge: Infant is cleared to discharge home with parents once medically cleared

## 2024-04-08 NOTE — PROGRESS NOTES
Pt presents to jame c/o decreased FM since this morning. Pt denies vaginal bleeding or leaking. Pt ststaes that she has been shelli all day. Pt placed on monitors x2. POC discussed. Pt refused SVE at this time    2308- dr rodrigues updated on pt status, requested SVE and for pt to stay on monitor for one more hour.    2315- pt agrees to SVE. 3/80/-3 bulging bag. Will updated dr rodrigues.    0000-offered to recheck pt. Pt refused. Pt states she would like to labor at home. Labor education provided. Pt states that she wants tothink about it.    0028- pt called out and said she is in 10/10 pain and would like to stay    0035-  dr rodrigues updated on pt status. Said to admit pt to L&D.    0048-reported off to [place]

## 2024-04-08 NOTE — PROGRESS NOTES
Pt received to room 311. Report received from L&D RN. Pt oriented to room, call light, infant security, emergency light, visiting hours and unit routine. Plan of care discussed encouraged to call with needs.

## 2024-04-08 NOTE — H&P
DATE OF ADMISSION:  2024     IDENTIFICATION:  This is a 29-year-old  1, para 0 with an EDC of   2024 and EGA of 40 and 1/7th weeks, who presents with a chief complaint   of uterine contractions.     HISTORY OF PRESENT ILLNESS:  She is a patient of Dr. Harvey who has gotten   good prenatal care.  Prenatal care has been uncomplicated.  She does have a   history of HSV and vaginitis.  She has not had outbreak in over a year.  She   complains of no prodromal symptoms.  She presents today complaining of uterine   contractions.  Denies spontaneous rupture of membranes or vaginal bleeding.    Here in labor and delivery, fetal heart tracings reactive, category 1.  She is   shelli irregularly.  Her cervix is 3, 90 and -2.  She has no other   complaints.  Denies nausea, vomiting, fever, chills, change in bowel or   bladder habits.  She admits to good fetal movement.  Denies symptoms of PIH.    Bright light exam here in labor and delivery is normal.  She had normal   genetics.  Her beta strep is reportedly negative.     OBSTETRICAL HISTORY:  This is her first pregnancy.     GYNECOLOGIC HISTORY:  History of HSV without recent outbreak or prodromal   symptom     MEDICAL HISTORY:  ALLERGIES:  IODINE.     CURRENT MEDICATIONS:  Prenatal vitamins, acyclovir, iron and fiber.     MEDICAL PROBLEMS:  Anemia, depression and anxiety.     PAST SURGICAL HISTORY:  None.     SOCIAL HISTORY:   Denies alcohol, tobacco or drug abuse.     FAMILY HISTORY:  Noncontributory.     REVIEW OF SYSTEMS:  Times 12 is negative per AMA standards available in chart.     LABORATORY DATA:  Her beta strep is negative.  She is Rh positive, rubella   nonimmune.  She had low risk NIPT.  Negative carrier screen.  Her 1-hour was   119.     PHYSICAL EXAMINATION:    VITAL SIGNS:  The patient is afebrile.  Vital signs within normal limits.    Fetal heart tracings reactive, category 1.  She is shelli irregularly.  GENERAL:  She is awake,  alert, in no apparent distress.  NECK:  Supple.  HEART:  Regular..  CHEST:  Clear.  BREASTS:  Symmetrical.  ABDOMEN:  Soft, gravid and size appropriate for dates.  EXTREMITIES:  Negative.  GYNECOLOGIC:  EGBUS within normal limits.  No perineal lesions.  Bright light   exam is negative.  Cervical exam as above.     ASSESSMENT:  At this time:  1.  Pregnancy at 40 and 1/7th weeks.  2.  Labor.  3.  Beta strep negative.  4.  Fetal status reassuring.  5.  History of herpes without recent outbreak or prodromal symptoms.  Bright   light exam is negative.     PLAN:  At this time;  1.  Admit.  2.  Fetal heart tone and contraction monitoring.  3.  Pain control.  4.  Augmentation if necessary.  5.  I have discussed with the patient her desire for an intermittent   monitoring in a context in which that is safe for her fetus.  6.  I have discussed with her previous herpetic history and the risk of   vertical transmission.  After careful counseling, she would like to move   forward with labor at this time.        ______________________________  MD VIKTOR Weems/TANI    DD:  04/08/2024 02:14  DT:  04/08/2024 02:51    Job#:  [id number]

## 2024-04-08 NOTE — L&D DELIVERY NOTE
DATE OF SERVICE:  2024     IDENTIFICATION:  This is a 29-year-old  1, para 0 who presents at 40   and 1/7 weeks' complaining of uterine contractions.     HISTORY OF PRESENT ILLNESS:  She is a patient of Dr. Harvey who has gotten   good prenatal care.  Prenatal care has been uncomplicated.  She is known beta   strep negative.  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 Bright light   exam upon admission was negative.  She was admitted in active labor at   approximately 4 cm, progressed over normal labor curve to complete with an   overall reassuring fetal heart tracing.  At complete, she was able to push the   baby down to +3 station, extend the baby's head and deliver atraumatically.    Membranes ruptured at this time.  Nares and mouth were bulb suctioned.  There   was no nuchal cord.  Shoulders and body followed without complication.  After   delay, the cord was clamped and cut.  Cord gases were collected and held.  The   infant was handed off to waiting nursing team.  At this point, the placenta   delivered intact, 3-vessel cord.  Uterus firmed up nicely after 20 units of   Pitocin.  Cervix was intact.  There was a second-degree midline laceration,   which was repaired under local with 3-0 Monocryl running and locking above the   hymenal ring, running below the hymenal ring was a subcuticular stitch back   up to the hymenal ring. Estimated blood loss was 200 mL.  The patient and baby   in recovery room in stable condition.     FINDINGS:  Included female infant with Apgars of 8 and 9.     COMPLICATIONS:  There were no complications.        ______________________________  MD VIKTOR Weems/TANI    DD:  2024 05:15  DT:  2024 05:37    Job#:  [id number]

## 2024-04-08 NOTE — PROGRESS NOTES
0040-Rcvd report from triage RN and assumed care of pt.  Pt wheeled to labor room S221. TOCO/FM applied. Pt requesting intermittent monitoring. Reviewed birth plan.  IV started and admit profile completed.  0440-complete  0447- of viable baby girl with 8/9 apgars  0700-report given to dayshift RN

## 2024-04-08 NOTE — LACTATION NOTE
Initial visit  MOB is P1.  @ 40+1  She reports her breasts grew during pregnancy. Denies any history of diabetes, thyroid disease, HTN, infertility.  Baby asleep, swaddled , in bassinette. MOB reports baby has latched once since delivery, just an hour ago. She reports strong suckling that was not painful. She questions whether baby was feeding well. Encouraged to call for latch assessment when baby again shows hunger cues. I reviewed hunger cues and feeding on cue without time limits. Discussed normal   feeding frequency of first 24 hours and importance of frequent skin to skin when MOB is awake and alert.

## 2024-04-09 PROCEDURE — A9270 NON-COVERED ITEM OR SERVICE: HCPCS | Performed by: OBSTETRICS & GYNECOLOGY

## 2024-04-09 PROCEDURE — 770002 HCHG ROOM/CARE - OB PRIVATE (112)

## 2024-04-09 PROCEDURE — 700102 HCHG RX REV CODE 250 W/ 637 OVERRIDE(OP): Performed by: OBSTETRICS & GYNECOLOGY

## 2024-04-09 RX ADMIN — DOCUSATE SODIUM 100 MG: 100 CAPSULE, LIQUID FILLED ORAL at 07:49

## 2024-04-09 RX ADMIN — PRENATAL WITH FERROUS FUM AND FOLIC ACID 1 TABLET: 3080; 920; 120; 400; 22; 1.84; 3; 20; 10; 1; 12; 200; 27; 25; 2 TABLET ORAL at 07:48

## 2024-04-09 RX ADMIN — ACETAMINOPHEN 1000 MG: 500 TABLET, FILM COATED ORAL at 04:27

## 2024-04-09 RX ADMIN — ACETAMINOPHEN 1000 MG: 500 TABLET, FILM COATED ORAL at 13:20

## 2024-04-09 RX ADMIN — DOCUSATE SODIUM 100 MG: 100 CAPSULE, LIQUID FILLED ORAL at 19:51

## 2024-04-09 ASSESSMENT — PAIN DESCRIPTION - PAIN TYPE
TYPE: ACUTE PAIN

## 2024-04-09 NOTE — LACTATION NOTE
This note was copied from a baby's chart.  After labs being drawn in nursery, MOB was set up to feed the infant. Upon entering nursery, MOB had latch her infant in a deep latch and she was feeding well with suck/swallow coordination noted. Encourage to call for assist with latch as needed during stay.

## 2024-04-09 NOTE — PROGRESS NOTES
1910 Assumed care of patient at change of shift.  Patient resting with eyes closed at this time.     2015 Assessment completed.  Fundus firm, lochia light.  Patient will call if pain management intervention is needed.  Plan of care was reviewed and patient voiced understanding. Discussed skin to skin, feeding cues, and on demand feeds.

## 2024-04-09 NOTE — DISCHARGE SUMMARY
Discharge Summary:      Dyllan Freeman    Admit Date:   2024  Discharge Date:  2024     Admitting diagnosis:  Labor and delivery indication for care or intervention [O75.9]  Discharge Diagnosis: Status post vaginal, spontaneous.  Pregnancy Complications: none  Tubal Ligation:  no        History:  Past Medical History:   Diagnosis Date    Anemia     Depression     Hyperemesis arising during pregnancy     Major depressive disorder, recurrent episode, moderate ([facility]) 10/04/2022    Vitamin D deficiency      OB History    Para Term  AB Living   1 1 1 0 0 1   SAB IAB Ectopic Molar Multiple Live Births   0 0 0 0 0 1      # Outcome Date GA Lbr Phong/2nd Weight Sex Delivery Anes PTL Lv   1 Term 24 40w1d / 00:07 3.08 kg (6 lb 12.6 oz) F Vag-Spont Local N RIVERA        Iodine contrast [diagnostic x-ray materials]  Patient Active Problem List    Diagnosis Date Noted    Labor and delivery indication for care or intervention 2024    Lymphadenopathy 2023    Less than 8 weeks gestation of pregnancy 2023    Cervical polyp 2023    Female fertility problem 2023    COVID 2022    Paroxysmal SVT (supraventricular tachycardia) ([facility]) 10/13/2022    Generalized anxiety disorder 10/04/2022    Vitamin D deficiency 2022    Prolactin increased 2022    Increased heart rate 2022    Recurrent major depressive disorder, in full remission ([facility]) 2022    Iron deficiency 2021        Hospital Course:   29 y.o. , now para 1, was admitted with the above mentioned diagnosis, underwent Active Labor, vaginal, spontaneous. Patient postpartum course was unremarkable, with progressive advancement in diet , ambulation and toleration of oral analgesia. Patient without complaints today and desires discharge.      Vitals:    24 1000 24 1400 24 1800 24 0600   BP: 115/67 110/72 129/82 108/68   Pulse: 90 (!) 108 100 80   Resp: 17 19 18 18    Temp: 36.3 °C (97.4 °F) 36.5 °C (97.7 °F) 36.4 °C (97.5 °F) 36.5 °C (97.7 °F)   TempSrc: Temporal Temporal Temporal Temporal   SpO2: 98% 98% 95% 99%   Weight:       Height:           Current Facility-Administered Medications   Medication Dose    LR infusion      oxytocin (Pitocin) infusion (for post delivery)  125 mL/hr    lactated ringers infusion      miSOPROStol (Cytotec) tablet 600 mcg  600 mcg    docusate sodium (Colace) capsule 100 mg  100 mg    ibuprofen (Motrin) tablet 800 mg  800 mg    acetaminophen (Tylenol) tablet 1,000 mg  1,000 mg    tetanus-dipth-acell pertussis (Tdap) inj 0.5 mL  0.5 mL    measles, mumps and rubella vaccine (Mmr) injection 0.5 mL  0.5 mL    prenatal plus vitamin (Stuartnatal 1+1) 27-1 MG tablet 1 Tablet  1 Tablet    magnesium hydroxide (Milk Of Magnesia) suspension 30 mL  30 mL    oxyCODONE immediate-release (Roxicodone) tablet 5 mg  5 mg    simethicone (Mylicon) chewable tablet 125 mg  125 mg    calcium carbonate (Tums) chewable tab 1,000 mg  1,000 mg       Exam:  Breast Exam: negative  Abdomen: Abdomen soft, non-tender. BS normal. No masses,  No organomegaly  Fundus Non Tender: yes  Incision: none  Perineum: perineum intact  Extremity: extremities, peripheral pulses and reflexes normal     Labs:  Recent Labs     04/08/24  0110 04/08/24  1641   WBC 11.6* 13.2*   RBC 4.52 3.97*   HEMOGLOBIN 13.2 11.5*   HEMATOCRIT 38.5 34.3*   MCV 85.2 86.4   MCH 29.2 29.0   MCHC 34.3 33.5   RDW 53.9* 54.4*   PLATELETCT 194 186   MPV 11.7 11.5        Activity:   Discharge to home  Pelvic Rest x 6 weeks    Assessment:  normal postpartum course  Discharge Assessment: No areas of skin breakdown/redness; surgical incision intact/healing     Follow up: .[place] 6 weeks      Discharge Meds:   No current outpatient medications on file.       Sadaf Dewey M.D.

## 2024-04-09 NOTE — PROGRESS NOTES
Assessment done vital signs stable. Patient progressing according to plan of care. Fundus firm at the umbilicus with light lochia. Patient up voiding without difficulty. Ambulating with steady gait.  Breast feeding infant on demand. Family at bedside. Patient denies problems at this time. Patient encouraged to call for any needs.

## 2024-04-09 NOTE — LACTATION NOTE
This note was copied from a baby's chart.  FOB holding baby skin to skin. Third temp check with minimal change. Infant to the nursery for warming.     Instruct MOB to call when infant back in order to support and assist with a deeper latch. MOB voices soreness.

## 2024-04-10 ENCOUNTER — PHARMACY VISIT (OUTPATIENT)
Dept: PHARMACY | Facility: MEDICAL CENTER | Age: 29
End: 2024-04-10
Payer: MEDICARE

## 2024-04-10 VITALS
RESPIRATION RATE: 18 BRPM | DIASTOLIC BLOOD PRESSURE: 77 MMHG | WEIGHT: 140 LBS | HEART RATE: 92 BPM | TEMPERATURE: 98.8 F | OXYGEN SATURATION: 95 % | HEIGHT: 66 IN | BODY MASS INDEX: 22.5 KG/M2 | SYSTOLIC BLOOD PRESSURE: 130 MMHG

## 2024-04-10 PROCEDURE — 700102 HCHG RX REV CODE 250 W/ 637 OVERRIDE(OP): Performed by: OBSTETRICS & GYNECOLOGY

## 2024-04-10 PROCEDURE — RXMED WILLOW AMBULATORY MEDICATION CHARGE: Performed by: OBSTETRICS & GYNECOLOGY

## 2024-04-10 PROCEDURE — A9270 NON-COVERED ITEM OR SERVICE: HCPCS | Performed by: OBSTETRICS & GYNECOLOGY

## 2024-04-10 RX ORDER — DOCUSATE SODIUM 100 MG/1
100 CAPSULE, LIQUID FILLED ORAL 2 TIMES DAILY PRN
Qty: 60 CAPSULE | Refills: 0 | Status: SHIPPED | OUTPATIENT
Start: 2024-04-10

## 2024-04-10 RX ORDER — ACETAMINOPHEN 500 MG
500-1000 TABLET ORAL EVERY 6 HOURS PRN
COMMUNITY
Start: 2024-04-10

## 2024-04-10 RX ORDER — IBUPROFEN 800 MG/1
800 TABLET ORAL EVERY 8 HOURS PRN
Qty: 30 TABLET | Refills: 0 | Status: SHIPPED | OUTPATIENT
Start: 2024-04-10

## 2024-04-10 RX ORDER — VITAMIN A ACETATE, BETA CAROTENE, ASCORBIC ACID, CHOLECALCIFEROL, .ALPHA.-TOCOPHEROL ACETATE, DL-, THIAMINE MONONITRATE, RIBOFLAVIN, NIACINAMIDE, PYRIDOXINE HYDROCHLORIDE, FOLIC ACID, CYANOCOBALAMIN, CALCIUM CARBONATE, FERROUS FUMARATE, ZINC OXIDE, CUPRIC OXIDE 3080; 12; 120; 400; 1; 1.84; 3; 20; 22; 920; 25; 200; 27; 10; 2 [IU]/1; UG/1; MG/1; [IU]/1; MG/1; MG/1; MG/1; MG/1; MG/1; [IU]/1; MG/1; MG/1; MG/1; MG/1; MG/1
1 TABLET, FILM COATED ORAL EVERY MORNING
Qty: 30 TABLET | Refills: 1 | Status: SHIPPED | OUTPATIENT
Start: 2024-04-10

## 2024-04-10 RX ADMIN — ACETAMINOPHEN 1000 MG: 500 TABLET, FILM COATED ORAL at 07:25

## 2024-04-10 RX ADMIN — PRENATAL WITH FERROUS FUM AND FOLIC ACID 1 TABLET: 3080; 920; 120; 400; 22; 1.84; 3; 20; 10; 1; 12; 200; 27; 25; 2 TABLET ORAL at 06:18

## 2024-04-10 ASSESSMENT — EDINBURGH POSTNATAL DEPRESSION SCALE (EPDS)
I HAVE LOOKED FORWARD WITH ENJOYMENT TO THINGS: AS MUCH AS I EVER DID
I HAVE BEEN ANXIOUS OR WORRIED FOR NO GOOD REASON: YES, SOMETIMES
THINGS HAVE BEEN GETTING ON TOP OF ME: NO, I HAVE BEEN COPING AS WELL AS EVER
I HAVE BEEN SO UNHAPPY THAT I HAVE HAD DIFFICULTY SLEEPING: NOT AT ALL
THE THOUGHT OF HARMING MYSELF HAS OCCURRED TO ME: NEVER
I HAVE BEEN ABLE TO LAUGH AND SEE THE FUNNY SIDE OF THINGS: AS MUCH AS I ALWAYS COULD
I HAVE BEEN SO UNHAPPY THAT I HAVE BEEN CRYING: ONLY OCCASIONALLY
I HAVE BLAMED MYSELF UNNECESSARILY WHEN THINGS WENT WRONG: NOT VERY OFTEN
I HAVE FELT SAD OR MISERABLE: NOT VERY OFTEN
I HAVE FELT SCARED OR PANICKY FOR NO GOOD REASON: NO, NOT MUCH

## 2024-04-10 ASSESSMENT — PAIN DESCRIPTION - PAIN TYPE: TYPE: ACUTE PAIN

## 2024-04-10 NOTE — DISCHARGE SUMMARY
Discharge Summary:     Date of Admission: 2024  Date of Discharge: 04/10/24      Admitting diagnosis:    1. Pregnancy @ 40w1d  2.  Normal labor      Discharge Diagnosis:   1. Status post spontaneous vaginal delivery    Past medical history: None  Past surgical history: None    OB History    Para Term  AB Living   1 1 1 0 0 1   SAB IAB Ectopic Molar Multiple Live Births   0 0 0 0 0 1      # Outcome Date GA Lbr Phong/2nd Weight Sex Delivery Anes PTL Lv   1 Term 24 40w1d / 00:07 3.08 kg (6 lb 12.6 oz) F Vag-Spont Local N RIVERA       Allergies: Iodine contrast [diagnostic x-ray materials]    Hospital Course:   Pt is a 29 y.o. now  who presented for contractions at 40 weeks 1 day and was found to be in spontaneous labor.  She was admitted for labor management and progressed to complete without incident.  Patient underwent normal spontaneous vaginal delivery, giving birth to a viable female infant with Apgars of 8 and 9, weight 3080 g.  The patient had a second-degree perineal laceration that was repaired at delivery.  The patient's postpartum course was unremarkable.  Remained afebrile with normal vital signs.  On postpartum day #2 she and the baby were ready for discharge.  On day of discharge pt was ambulating, voiding spontaneously, tolerating PO, with adequate pain control, and desiring to go home.    Physical Exam:  Temp:  [36.4 °C (97.6 °F)-36.5 °C (97.7 °F)] 36.4 °C (97.6 °F)  Pulse:  [80-94] 94  Resp:  [18] 18  BP: (108-109)/(68-75) 109/75  SpO2:  [98 %-99 %] 98 %  Gen: AAO, NAD  Abd: soft, NT, ND, fundus firm below umbilicus  Ext: NT, no edema bilaterally    Current Facility-Administered Medications   Medication Dose    LR infusion      oxytocin (Pitocin) infusion (for post delivery)  125 mL/hr    lactated ringers infusion      miSOPROStol (Cytotec) tablet 600 mcg  600 mcg    docusate sodium (Colace) capsule 100 mg  100 mg    ibuprofen (Motrin) tablet 800 mg  800 mg    acetaminophen  (Tylenol) tablet 1,000 mg  1,000 mg    tetanus-dipth-acell pertussis (Tdap) inj 0.5 mL  0.5 mL    measles, mumps and rubella vaccine (Mmr) injection 0.5 mL  0.5 mL    prenatal plus vitamin (Stuartnatal 1+1) 27-1 MG tablet 1 Tablet  1 Tablet    magnesium hydroxide (Milk Of Magnesia) suspension 30 mL  30 mL    oxyCODONE immediate-release (Roxicodone) tablet 5 mg  5 mg    simethicone (Mylicon) chewable tablet 125 mg  125 mg    calcium carbonate (Tums) chewable tab 1,000 mg  1,000 mg       Recent Labs     04/08/24  0110 04/08/24  1641   WBC 11.6* 13.2*   RBC 4.52 3.97*   HEMOGLOBIN 13.2 11.5*   HEMATOCRIT 38.5 34.3*   MCV 85.2 86.4   MCH 29.2 29.0   MCHC 34.3 33.5   RDW 53.9* 54.4*   PLATELETCT 194 186   MPV 11.7 11.5         Activity/ Discharge Instructions::   Discharge to home  Pelvic Rest x 6 weeks  No heavy lifting x4 weeks  Call or come to ED for: heavy vaginal bleeding, fever >100.4, severe abdominal pain, severe headache, chest pain, shortness of breath,  N/V, abnormal vaginal discharge, or other concerns.       Follow up:  5-6wks for PP visit with primary OB    Discharge Meds:      Medication List        START taking these medications        Instructions   acetaminophen 500 MG Tabs  Commonly known as: Tylenol   Take 1-2 Tablets by mouth every 6 hours as needed for Mild Pain.  Dose: 500-1,000 mg     docusate sodium 100 MG Caps  Commonly known as: Colace   Take 1 Capsule by mouth 2 times a day as needed for Constipation.  Dose: 100 mg     ibuprofen 800 MG Tabs  Commonly known as: Motrin   Take 1 Tablet by mouth every 8 hours as needed for Moderate Pain.  Dose: 800 mg            CHANGE how you take these medications        Instructions   prenatal plus vitamin 27-1 MG Tabs tablet  What changed:   medication strength  how much to take  when to take this   Take 1 Tablet by mouth every morning.  Dose: 1 Tablet            CONTINUE taking these medications        Instructions   Iron Folate Plus Caps   Take  by mouth.      OMEGA-3 FATTY ACIDS PO   Take  by mouth.            STOP taking these medications      prochlorperazine 10 MG Tabs  Commonly known as: Compazine     pyridoxine 50 MG Tabs  Commonly known as: Vitamin B-6                 Cierra Weller MD  [facility]

## 2024-04-10 NOTE — LACTATION NOTE
MOB reports that the infant is now latching and breastfeeding well. Her nipples are now healing and she denies soreness. Plan of care is to discharge to home breastfeeding well. Review lactation plan and outpatient lactation resources.Denies questions or concerns.  Preparing for discharge to home.     Breastfeeding plan:    Feed baby with feeding cues and at least a minimum of 8x/24 hours.  Expect cluster feeding as this is normal during early days of life and growth spurts.  It is not recommended to let baby sleep longer than 4 hours between feedings and if sleepy, place skin to skin to promote feeding interest and milk production.  Baby's usually feed more frequently and longer when skin to skin with mother. It is not recommended to use pacifiers or supplementing with formula until breast feeding and milk production is well established or at least 4 weeks.    Make sure infant is getting enough by ensuring frequent feedings as well as looking for transitioning stools from dark meconium to bright yellow/green seedy loose stools by day 5.     Follow up with PEDS PCP as scheduled for weight checks and to make sure feeding is progressing appropriately.    Call for breastfeeding for 1:1 lactation consultation through  [facility] (see information sheet) as needed and attend the  [facility] without need for appointment.

## 2024-04-10 NOTE — PROGRESS NOTES
Assessment done vital signs stable. Patient progressing according to plan of care. Fundus firm at the umbilicus with light lochia. Patient up voiding without difficulty. Ambulating with steady gait. Claims to have good pain relief with p.o medications. Breast feeding infant on demand. Family at bedside. Patient denies problems at this time. Patient encouraged to call for any needs.

## 2024-04-10 NOTE — CARE PLAN
The patient is Stable - Low risk of patient condition declining or worsening    Shift Goals  Clinical Goals: fundus and lochia WNL, pain management  Patient Goals: pain management, rest  Family Goals: support    Progress made toward(s) clinical / shift goals: Fundus firm, lochia scant. Pt's pain managed with prn pain medications and non-pharmacological methods. Pt resting with support person at bedside.    Problem: Psychosocial - Postpartum  Goal: Patient will verbalize and demonstrate effective bonding and parenting behavior  Outcome: Progressing     Problem: Altered Physiologic Condition  Goal: Patient physiologically stable as evidenced by normal lochia, palpable uterine involution and vitals within normal limits  Outcome: Progressing     Patient is not progressing towards the following goals: NA

## 2024-04-10 NOTE — DISCHARGE INSTRUCTIONS

## 2024-10-02 ENCOUNTER — APPOINTMENT (OUTPATIENT)
Dept: MEDICAL GROUP | Facility: PHYSICIAN GROUP | Age: 29
End: 2024-10-02
Payer: COMMERCIAL

## 2024-10-02 VITALS
DIASTOLIC BLOOD PRESSURE: 60 MMHG | OXYGEN SATURATION: 97 % | BODY MASS INDEX: 18.71 KG/M2 | TEMPERATURE: 97.3 F | SYSTOLIC BLOOD PRESSURE: 90 MMHG | WEIGHT: 116.4 LBS | HEIGHT: 66 IN | HEART RATE: 72 BPM

## 2024-10-02 DIAGNOSIS — E61.1 IRON DEFICIENCY: ICD-10-CM

## 2024-10-02 DIAGNOSIS — E55.9 VITAMIN D DEFICIENCY: ICD-10-CM

## 2024-10-02 DIAGNOSIS — R63.4 WEIGHT LOSS: ICD-10-CM

## 2024-10-02 DIAGNOSIS — E78.00 ELEVATED LDL CHOLESTEROL LEVEL: ICD-10-CM

## 2024-10-02 DIAGNOSIS — O26.819 PREGNANCY RELATED FATIGUE, ANTEPARTUM: ICD-10-CM

## 2024-10-02 PROCEDURE — 99214 OFFICE O/P EST MOD 30 MIN: CPT

## 2024-10-02 PROCEDURE — 3078F DIAST BP <80 MM HG: CPT

## 2024-10-02 PROCEDURE — 3074F SYST BP LT 130 MM HG: CPT

## 2024-10-02 ASSESSMENT — ENCOUNTER SYMPTOMS
CONSTIPATION: 0
MYALGIAS: 0
VOMITING: 0
SHORTNESS OF BREATH: 0
BLURRED VISION: 0
WEIGHT LOSS: 1
CHILLS: 0
ABDOMINAL PAIN: 0
FEVER: 0
NAUSEA: 0
DIZZINESS: 0
DIARRHEA: 0
HEADACHES: 0
WEAKNESS: 1
COUGH: 0

## 2024-10-02 ASSESSMENT — FIBROSIS 4 INDEX: FIB4 SCORE: 0.74

## 2024-11-02 ENCOUNTER — HOSPITAL ENCOUNTER (OUTPATIENT)
Dept: LAB | Facility: MEDICAL CENTER | Age: 29
End: 2024-11-02
Payer: COMMERCIAL

## 2024-11-02 DIAGNOSIS — E55.9 VITAMIN D DEFICIENCY: ICD-10-CM

## 2024-11-02 DIAGNOSIS — O26.819 PREGNANCY RELATED FATIGUE, ANTEPARTUM: ICD-10-CM

## 2024-11-02 DIAGNOSIS — E78.00 ELEVATED LDL CHOLESTEROL LEVEL: ICD-10-CM

## 2024-11-02 DIAGNOSIS — R63.4 WEIGHT LOSS: ICD-10-CM

## 2024-11-02 DIAGNOSIS — E61.1 IRON DEFICIENCY: ICD-10-CM

## 2024-11-02 LAB
25(OH)D3 SERPL-MCNC: 20 NG/ML (ref 30–100)
ALBUMIN SERPL BCP-MCNC: 4.2 G/DL (ref 3.2–4.9)
ALBUMIN/GLOB SERPL: 1.4 G/DL
ALP SERPL-CCNC: 135 U/L (ref 30–99)
ALT SERPL-CCNC: 13 U/L (ref 2–50)
ANION GAP SERPL CALC-SCNC: 8 MMOL/L (ref 7–16)
AST SERPL-CCNC: 18 U/L (ref 12–45)
BILIRUB SERPL-MCNC: 0.3 MG/DL (ref 0.1–1.5)
BUN SERPL-MCNC: 7 MG/DL (ref 8–22)
CALCIUM ALBUM COR SERPL-MCNC: 9.1 MG/DL (ref 8.5–10.5)
CALCIUM SERPL-MCNC: 9.3 MG/DL (ref 8.5–10.5)
CHLORIDE SERPL-SCNC: 105 MMOL/L (ref 96–112)
CHOLEST SERPL-MCNC: 201 MG/DL (ref 100–199)
CO2 SERPL-SCNC: 25 MMOL/L (ref 20–33)
CREAT SERPL-MCNC: 0.59 MG/DL (ref 0.5–1.4)
ERYTHROCYTE [DISTWIDTH] IN BLOOD BY AUTOMATED COUNT: 41.1 FL (ref 35.9–50)
FOLATE SERPL-MCNC: 30.7 NG/ML
GFR SERPLBLD CREATININE-BSD FMLA CKD-EPI: 124 ML/MIN/1.73 M 2
GLOBULIN SER CALC-MCNC: 3.1 G/DL (ref 1.9–3.5)
GLUCOSE SERPL-MCNC: 81 MG/DL (ref 65–99)
HCT VFR BLD AUTO: 40.7 % (ref 37–47)
HDLC SERPL-MCNC: 54 MG/DL
HGB BLD-MCNC: 12.9 G/DL (ref 12–16)
IRON SATN MFR SERPL: 19 % (ref 15–55)
IRON SERPL-MCNC: 62 UG/DL (ref 40–170)
LDLC SERPL CALC-MCNC: 123 MG/DL
MCH RBC QN AUTO: 27.7 PG (ref 27–33)
MCHC RBC AUTO-ENTMCNC: 31.7 G/DL (ref 32.2–35.5)
MCV RBC AUTO: 87.5 FL (ref 81.4–97.8)
PLATELET # BLD AUTO: 300 K/UL (ref 164–446)
PMV BLD AUTO: 12.2 FL (ref 9–12.9)
POTASSIUM SERPL-SCNC: 4.2 MMOL/L (ref 3.6–5.5)
PREALB SERPL-MCNC: 24.5 MG/DL (ref 18–38)
PROT SERPL-MCNC: 7.3 G/DL (ref 6–8.2)
RBC # BLD AUTO: 4.65 M/UL (ref 4.2–5.4)
SODIUM SERPL-SCNC: 138 MMOL/L (ref 135–145)
T4 FREE SERPL-MCNC: 1.18 NG/DL (ref 0.93–1.7)
TIBC SERPL-MCNC: 326 UG/DL (ref 250–450)
TRIGL SERPL-MCNC: 122 MG/DL (ref 0–149)
TSH SERPL-ACNC: 1.02 UIU/ML (ref 0.35–5.5)
UIBC SERPL-MCNC: 264 UG/DL (ref 110–370)
VIT B12 SERPL-MCNC: 616 PG/ML (ref 211–911)
WBC # BLD AUTO: 6.3 K/UL (ref 4.8–10.8)

## 2024-11-02 PROCEDURE — 83540 ASSAY OF IRON: CPT

## 2024-11-02 PROCEDURE — 84134 ASSAY OF PREALBUMIN: CPT

## 2024-11-02 PROCEDURE — 80053 COMPREHEN METABOLIC PANEL: CPT

## 2024-11-02 PROCEDURE — 84630 ASSAY OF ZINC: CPT

## 2024-11-02 PROCEDURE — 84207 ASSAY OF VITAMIN B-6: CPT

## 2024-11-02 PROCEDURE — 82306 VITAMIN D 25 HYDROXY: CPT

## 2024-11-02 PROCEDURE — 36415 COLL VENOUS BLD VENIPUNCTURE: CPT

## 2024-11-02 PROCEDURE — 80061 LIPID PANEL: CPT

## 2024-11-02 PROCEDURE — 82746 ASSAY OF FOLIC ACID SERUM: CPT

## 2024-11-02 PROCEDURE — 85027 COMPLETE CBC AUTOMATED: CPT

## 2024-11-02 PROCEDURE — 84590 ASSAY OF VITAMIN A: CPT

## 2024-11-02 PROCEDURE — 82607 VITAMIN B-12: CPT

## 2024-11-02 PROCEDURE — 84443 ASSAY THYROID STIM HORMONE: CPT

## 2024-11-02 PROCEDURE — 84425 ASSAY OF VITAMIN B-1: CPT

## 2024-11-02 PROCEDURE — 84439 ASSAY OF FREE THYROXINE: CPT

## 2024-11-02 PROCEDURE — 83550 IRON BINDING TEST: CPT

## 2024-11-05 LAB — ZINC SERPL-MCNC: 50.1 UG/DL (ref 60–120)

## 2024-11-06 LAB
ANNOTATION COMMENT IMP: NORMAL
RETINYL PALMITATE SERPL-MCNC: <0.02 MG/L (ref 0–0.1)
VIT A SERPL-MCNC: 0.48 MG/L (ref 0.3–1.2)

## 2024-11-08 LAB
VIT B1 BLD-MCNC: 95 NMOL/L (ref 70–180)
VIT B6 SERPL-MCNC: 22.2 NMOL/L (ref 20–125)

## 2025-01-22 ENCOUNTER — OFFICE VISIT (OUTPATIENT)
Dept: URGENT CARE | Facility: CLINIC | Age: 30
End: 2025-01-22
Payer: COMMERCIAL

## 2025-01-22 VITALS
OXYGEN SATURATION: 98 % | RESPIRATION RATE: 20 BRPM | BODY MASS INDEX: 18.64 KG/M2 | DIASTOLIC BLOOD PRESSURE: 80 MMHG | HEIGHT: 66 IN | TEMPERATURE: 98.5 F | HEART RATE: 110 BPM | SYSTOLIC BLOOD PRESSURE: 100 MMHG | WEIGHT: 116 LBS

## 2025-01-22 DIAGNOSIS — R68.89 FLU-LIKE SYMPTOMS: ICD-10-CM

## 2025-01-22 LAB
FLUAV RNA SPEC QL NAA+PROBE: NEGATIVE
FLUBV RNA SPEC QL NAA+PROBE: NEGATIVE
RSV RNA SPEC QL NAA+PROBE: NEGATIVE
SARS-COV-2 RNA RESP QL NAA+PROBE: NEGATIVE

## 2025-01-22 PROCEDURE — 0241U POCT CEPHEID COV-2, FLU A/B, RSV - PCR: CPT

## 2025-01-22 PROCEDURE — 99213 OFFICE O/P EST LOW 20 MIN: CPT

## 2025-01-22 PROCEDURE — 3074F SYST BP LT 130 MM HG: CPT

## 2025-01-22 PROCEDURE — 3079F DIAST BP 80-89 MM HG: CPT

## 2025-01-22 ASSESSMENT — ENCOUNTER SYMPTOMS
COUGH: 0
MYALGIAS: 1
FEVER: 1

## 2025-01-22 ASSESSMENT — FIBROSIS 4 INDEX: FIB4 SCORE: 0.48

## 2025-01-23 NOTE — PROGRESS NOTES
Verbal consent was acquired by the patient to use [facility] ambient listening note generation during this visit   Subjective:   Dyllan Freeman is a 29 y.o. female who presents for Fever (Fever x Monday, BA, runny nose, sneezing, nasal rob)      HPI:  History of Present Illness  The patient is a 29-year-old female who presents for evaluation of flu like symptoms.     She has been experiencing symptoms of fever and cold for 2 days, with recorded temperatures ranging from 100.5 to 100.9 degrees Fahrenheit. She also reports joint pain and generalized body aches. Her symptoms include occasional sneezing, runny nose, and nasal congestion. She does not report any cough. She expresses concern about potential transmission of her illness to her 9-month-old child, given that she is currently breastfeeding. She has been managing her symptoms with DayQuil and NyQuil.           Review of Systems   Constitutional:  Positive for fever.   HENT:  Positive for congestion.    Respiratory:  Negative for cough.    Musculoskeletal:  Positive for myalgias.       Medications:    Current Outpatient Medications on File Prior to Visit   Medication Sig Dispense Refill    sertraline (ZOLOFT) 50 MG Tab        No current facility-administered medications on file prior to visit.        Allergies:   Iodine contrast [diagnostic x-ray materials]    Problem List:   Patient Active Problem List   Diagnosis    Prolactin increased    Increased heart rate    Recurrent major depressive disorder, in full remission ([facility])    Vitamin D deficiency    Generalized anxiety disorder    Paroxysmal SVT (supraventricular tachycardia) ([facility])    COVID    Female fertility problem    Iron deficiency    Cervical polyp    Lymphadenopathy    Less than 8 weeks gestation of pregnancy    Labor and delivery indication for care or intervention        Surgical History:  No past surgical history on file.    Past Social Hx:   Social History     Tobacco Use    Smoking  "status: Never    Smokeless tobacco: Never   Vaping Use    Vaping status: Never Used   Substance Use Topics    Alcohol use: Not Currently    Drug use: Not Currently          Problem list, medications, and allergies reviewed by myself today in Epic.     Objective:     /80   Pulse (!) 110   Temp 36.9 °C (98.5 °F) (Temporal)   Resp 20   Ht 1.676 m (5' 6\")   Wt 52.6 kg (116 lb)   SpO2 98%   BMI 18.72 kg/m²     Physical Exam  Vitals and nursing note reviewed.   Constitutional:       General: She is not in acute distress.     Appearance: Normal appearance. She is normal weight. She is not ill-appearing, toxic-appearing or diaphoretic.   HENT:      Head: Normocephalic and atraumatic.      Right Ear: Tympanic membrane, ear canal and external ear normal. There is no impacted cerumen.      Left Ear: Ear canal and external ear normal. There is no impacted cerumen.      Nose: Congestion present. No rhinorrhea.      Mouth/Throat:      Mouth: Mucous membranes are moist.      Pharynx: No oropharyngeal exudate or posterior oropharyngeal erythema.   Cardiovascular:      Rate and Rhythm: Regular rhythm. Tachycardia present.      Pulses: Normal pulses.      Heart sounds: Normal heart sounds. No murmur heard.     No friction rub. No gallop.   Pulmonary:      Effort: Pulmonary effort is normal. No respiratory distress.      Breath sounds: Normal breath sounds. No stridor. No wheezing, rhonchi or rales.   Chest:      Chest wall: No tenderness.   Musculoskeletal:      Cervical back: Neck supple. No tenderness.   Lymphadenopathy:      Cervical: No cervical adenopathy.   Skin:     General: Skin is warm and dry.      Capillary Refill: Capillary refill takes less than 2 seconds.   Neurological:      General: No focal deficit present.      Mental Status: She is alert and oriented to person, place, and time. Mental status is at baseline.      Cranial Nerves: No cranial nerve deficit.      Motor: No weakness.      Gait: Gait normal. "   Psychiatric:         Mood and Affect: Mood normal.         Behavior: Behavior normal.         Thought Content: Thought content normal.         Judgment: Judgment normal.         Assessment/Plan:     Diagnosis and associated orders:   1. Flu-like symptoms  - POCT CoV-2, Flu A/B, RSV by PCR    Results for orders placed or performed in visit on 01/22/25   POCT CoV-2, Flu A/B, RSV by PCR    Collection Time: 01/22/25  8:20 PM   Result Value Ref Range    SARS-CoV-2 by PCR Negative Negative, Invalid    Influenza virus A RNA Negative Negative, Invalid    Influenza virus B, PCR Negative Negative, Invalid    RSV, PCR Negative Negative, Invalid        Comments/MDM:   Pt is clinically stable at today's acute urgent care visit.  No acute distress noted. Appropriate for outpatient management at this time.     Assessment & Plan  I have discussed with patient that symptoms are viral in etiology. I have recommended supportive care to include; Increased fluids, rest, over-the-counter cold and flu medications, alternating Tylenol and/or ibuprofen per manufactures instructions for symptomatic relief and fevers, and the use of a humidifier. Patient is to return to UC or go to ER for any new or worsening s/s, and follow up with PCP for recheck. Patient is agreeable with plan of care and verbalized good understanding.              Discussed DDx, management options (risks,benefits, and alternatives to planned treatment), natural progression and supportive care.  Expressed understanding and the treatment plan was agreed upon. Questions were encouraged and answered   Return to urgent care prn if new or worsening sx or if there is no improvement in condition prn.    Educated in Red flags and indications to immediately call 911 or present to the Emergency Department.   Advised the patient to follow-up with the primary care physician for recheck, reevaluation, and consideration of further management.    I personally reviewed prior external  notes and test results pertinent to today's visit.  I have independently reviewed and interpreted all diagnostics ordered during this urgent care acute visit.       Please note that this dictation was created using voice recognition software. I have made a reasonable attempt to correct obvious errors, but I expect that there are errors of grammar and possibly content that I did not discover before finalizing the note.    This note was electronically signed by MARTHA Puri

## 2025-04-07 ENCOUNTER — APPOINTMENT (OUTPATIENT)
Dept: URGENT CARE | Facility: CLINIC | Age: 30
End: 2025-04-07
Payer: COMMERCIAL

## 2025-08-09 ENCOUNTER — HOSPITAL ENCOUNTER (OUTPATIENT)
Dept: LAB | Facility: MEDICAL CENTER | Age: 30
End: 2025-08-09
Attending: NURSE PRACTITIONER
Payer: COMMERCIAL

## 2025-08-09 LAB
FERRITIN SERPL-MCNC: 22.6 NG/ML (ref 10–291)
IRON SATN MFR SERPL: 13 % (ref 15–55)
IRON SERPL-MCNC: 50 UG/DL (ref 40–170)
T3 SERPL-MCNC: 115 NG/DL (ref 60–181)
T4 FREE SERPL-MCNC: 1.4 NG/DL (ref 0.93–1.7)
TIBC SERPL-MCNC: 390 UG/DL (ref 250–450)
TSH SERPL-ACNC: 0.98 UIU/ML (ref 0.38–5.33)
UIBC SERPL-MCNC: 340 UG/DL (ref 110–370)

## 2025-08-09 PROCEDURE — 36415 COLL VENOUS BLD VENIPUNCTURE: CPT

## 2025-08-09 PROCEDURE — 82728 ASSAY OF FERRITIN: CPT

## 2025-08-09 PROCEDURE — 84443 ASSAY THYROID STIM HORMONE: CPT

## 2025-08-09 PROCEDURE — 83550 IRON BINDING TEST: CPT

## 2025-08-09 PROCEDURE — 84439 ASSAY OF FREE THYROXINE: CPT

## 2025-08-09 PROCEDURE — 83540 ASSAY OF IRON: CPT

## 2025-08-09 PROCEDURE — 84480 ASSAY TRIIODOTHYRONINE (T3): CPT

## 2025-08-11 ENCOUNTER — APPOINTMENT (OUTPATIENT)
Dept: RADIOLOGY | Facility: MEDICAL CENTER | Age: 30
End: 2025-08-11
Attending: EMERGENCY MEDICINE
Payer: COMMERCIAL

## 2025-08-11 ENCOUNTER — HOSPITAL ENCOUNTER (EMERGENCY)
Facility: MEDICAL CENTER | Age: 30
End: 2025-08-11
Attending: EMERGENCY MEDICINE
Payer: COMMERCIAL

## 2025-08-11 ENCOUNTER — OFFICE VISIT (OUTPATIENT)
Dept: MEDICAL GROUP | Facility: PHYSICIAN GROUP | Age: 30
End: 2025-08-11
Payer: COMMERCIAL

## 2025-08-11 VITALS
HEART RATE: 83 BPM | SYSTOLIC BLOOD PRESSURE: 90 MMHG | OXYGEN SATURATION: 100 % | BODY MASS INDEX: 19.7 KG/M2 | HEIGHT: 66 IN | DIASTOLIC BLOOD PRESSURE: 60 MMHG | TEMPERATURE: 97.8 F | WEIGHT: 122.6 LBS

## 2025-08-11 VITALS
HEART RATE: 88 BPM | TEMPERATURE: 98.8 F | HEIGHT: 66 IN | OXYGEN SATURATION: 98 % | DIASTOLIC BLOOD PRESSURE: 88 MMHG | RESPIRATION RATE: 16 BRPM | SYSTOLIC BLOOD PRESSURE: 108 MMHG | WEIGHT: 122.14 LBS | BODY MASS INDEX: 19.63 KG/M2

## 2025-08-11 DIAGNOSIS — R07.9 CHEST PAIN, UNSPECIFIED TYPE: Primary | ICD-10-CM

## 2025-08-11 DIAGNOSIS — F41.1 GENERALIZED ANXIETY DISORDER: ICD-10-CM

## 2025-08-11 DIAGNOSIS — E55.9 VITAMIN D DEFICIENCY: ICD-10-CM

## 2025-08-11 DIAGNOSIS — E78.00 ELEVATED LDL CHOLESTEROL LEVEL: ICD-10-CM

## 2025-08-11 DIAGNOSIS — R07.89 OTHER CHEST PAIN: Primary | ICD-10-CM

## 2025-08-11 DIAGNOSIS — R53.83 OTHER FATIGUE: ICD-10-CM

## 2025-08-11 DIAGNOSIS — E61.1 IRON DEFICIENCY: ICD-10-CM

## 2025-08-11 DIAGNOSIS — F41.9 ANXIETY: ICD-10-CM

## 2025-08-11 LAB
ALBUMIN SERPL BCP-MCNC: 4.2 G/DL (ref 3.2–4.9)
ALBUMIN/GLOB SERPL: 1.3 G/DL
ALP SERPL-CCNC: 88 U/L (ref 30–99)
ALT SERPL-CCNC: 12 U/L (ref 2–50)
ANION GAP SERPL CALC-SCNC: 11 MMOL/L (ref 7–16)
AST SERPL-CCNC: 17 U/L (ref 12–45)
BASOPHILS # BLD AUTO: 0.4 % (ref 0–1.8)
BASOPHILS # BLD: 0.03 K/UL (ref 0–0.12)
BILIRUB SERPL-MCNC: 0.3 MG/DL (ref 0.1–1.5)
BUN SERPL-MCNC: 8 MG/DL (ref 8–22)
CALCIUM ALBUM COR SERPL-MCNC: 8.1 MG/DL (ref 8.5–10.5)
CALCIUM SERPL-MCNC: 8.3 MG/DL (ref 8.5–10.5)
CHLORIDE SERPL-SCNC: 109 MMOL/L (ref 96–112)
CO2 SERPL-SCNC: 20 MMOL/L (ref 20–33)
CREAT SERPL-MCNC: 0.64 MG/DL (ref 0.5–1.4)
D DIMER PPP IA.FEU-MCNC: <0.27 UG/ML (FEU) (ref 0–0.5)
EKG IMPRESSION: NORMAL
EOSINOPHIL # BLD AUTO: 0.17 K/UL (ref 0–0.51)
EOSINOPHIL NFR BLD: 2.5 % (ref 0–6.9)
ERYTHROCYTE [DISTWIDTH] IN BLOOD BY AUTOMATED COUNT: 38.5 FL (ref 35.9–50)
GFR SERPLBLD CREATININE-BSD FMLA CKD-EPI: 121 ML/MIN/1.73 M 2
GLOBULIN SER CALC-MCNC: 3.2 G/DL (ref 1.9–3.5)
GLUCOSE SERPL-MCNC: 85 MG/DL (ref 65–99)
HCG SERPL QL: NEGATIVE
HCT VFR BLD AUTO: 39.7 % (ref 37–47)
HGB BLD-MCNC: 13.4 G/DL (ref 12–16)
IMM GRANULOCYTES # BLD AUTO: 0.02 K/UL (ref 0–0.11)
IMM GRANULOCYTES NFR BLD AUTO: 0.3 % (ref 0–0.9)
LIPASE SERPL-CCNC: 43 U/L (ref 11–82)
LYMPHOCYTES # BLD AUTO: 3.09 K/UL (ref 1–4.8)
LYMPHOCYTES NFR BLD: 44.8 % (ref 22–41)
MCH RBC QN AUTO: 28 PG (ref 27–33)
MCHC RBC AUTO-ENTMCNC: 33.8 G/DL (ref 32.2–35.5)
MCV RBC AUTO: 82.9 FL (ref 81.4–97.8)
MONOCYTES # BLD AUTO: 0.34 K/UL (ref 0–0.85)
MONOCYTES NFR BLD AUTO: 4.9 % (ref 0–13.4)
NEUTROPHILS # BLD AUTO: 3.25 K/UL (ref 1.82–7.42)
NEUTROPHILS NFR BLD: 47.1 % (ref 44–72)
NRBC # BLD AUTO: 0 K/UL
NRBC BLD-RTO: 0 /100 WBC (ref 0–0.2)
PLATELET # BLD AUTO: 292 K/UL (ref 164–446)
PMV BLD AUTO: 10.8 FL (ref 9–12.9)
POTASSIUM SERPL-SCNC: 4.1 MMOL/L (ref 3.6–5.5)
PROT SERPL-MCNC: 7.4 G/DL (ref 6–8.2)
RBC # BLD AUTO: 4.79 M/UL (ref 4.2–5.4)
SODIUM SERPL-SCNC: 140 MMOL/L (ref 135–145)
TROPONIN T SERPL-MCNC: <6 NG/L (ref 6–19)
WBC # BLD AUTO: 6.9 K/UL (ref 4.8–10.8)

## 2025-08-11 PROCEDURE — 700102 HCHG RX REV CODE 250 W/ 637 OVERRIDE(OP): Performed by: EMERGENCY MEDICINE

## 2025-08-11 PROCEDURE — 99214 OFFICE O/P EST MOD 30 MIN: CPT

## 2025-08-11 PROCEDURE — 85025 COMPLETE CBC W/AUTO DIFF WBC: CPT

## 2025-08-11 PROCEDURE — A9270 NON-COVERED ITEM OR SERVICE: HCPCS | Performed by: EMERGENCY MEDICINE

## 2025-08-11 PROCEDURE — 85379 FIBRIN DEGRADATION QUANT: CPT

## 2025-08-11 PROCEDURE — 93005 ELECTROCARDIOGRAM TRACING: CPT | Mod: TC

## 2025-08-11 PROCEDURE — 84703 CHORIONIC GONADOTROPIN ASSAY: CPT

## 2025-08-11 PROCEDURE — 84484 ASSAY OF TROPONIN QUANT: CPT

## 2025-08-11 PROCEDURE — 71045 X-RAY EXAM CHEST 1 VIEW: CPT

## 2025-08-11 PROCEDURE — 80053 COMPREHEN METABOLIC PANEL: CPT

## 2025-08-11 PROCEDURE — 93005 ELECTROCARDIOGRAM TRACING: CPT | Mod: TC | Performed by: EMERGENCY MEDICINE

## 2025-08-11 PROCEDURE — 3078F DIAST BP <80 MM HG: CPT

## 2025-08-11 PROCEDURE — 99284 EMERGENCY DEPT VISIT MOD MDM: CPT

## 2025-08-11 PROCEDURE — 83690 ASSAY OF LIPASE: CPT

## 2025-08-11 PROCEDURE — 36415 COLL VENOUS BLD VENIPUNCTURE: CPT

## 2025-08-11 PROCEDURE — 3074F SYST BP LT 130 MM HG: CPT

## 2025-08-11 RX ADMIN — LIDOCAINE HYDROCHLORIDE 30 ML: 20 SOLUTION ORAL; TOPICAL at 19:38

## 2025-08-11 ASSESSMENT — ENCOUNTER SYMPTOMS
NERVOUS/ANXIOUS: 1
COUGH: 0
DIZZINESS: 0
HEADACHES: 0
VOMITING: 0
DIARRHEA: 0
WEAKNESS: 0
NAUSEA: 0
CONSTIPATION: 0
BLURRED VISION: 0
HEARTBURN: 0
SHORTNESS OF BREATH: 1
WEIGHT LOSS: 0
MYALGIAS: 0
CHILLS: 0
FEVER: 0
ABDOMINAL PAIN: 0

## 2025-08-11 ASSESSMENT — PATIENT HEALTH QUESTIONNAIRE - PHQ9
5. POOR APPETITE OR OVEREATING: NOT AT ALL
7. TROUBLE CONCENTRATING ON THINGS, SUCH AS READING THE NEWSPAPER OR WATCHING TELEVISION: NOT AT ALL
9. THOUGHTS THAT YOU WOULD BE BETTER OFF DEAD, OR OF HURTING YOURSELF: NOT AT ALL
2. FEELING DOWN, DEPRESSED, IRRITABLE, OR HOPELESS: NOT AT ALL
8. MOVING OR SPEAKING SO SLOWLY THAT OTHER PEOPLE COULD HAVE NOTICED. OR THE OPPOSITE, BEING SO FIGETY OR RESTLESS THAT YOU HAVE BEEN MOVING AROUND A LOT MORE THAN USUAL: NOT AT ALL
1. LITTLE INTEREST OR PLEASURE IN DOING THINGS: NOT AT ALL
6. FEELING BAD ABOUT YOURSELF - OR THAT YOU ARE A FAILURE OR HAVE LET YOURSELF OR YOUR FAMILY DOWN: NOT AL ALL
SUM OF ALL RESPONSES TO PHQ9 QUESTIONS 1 AND 2: 0
SUM OF ALL RESPONSES TO PHQ QUESTIONS 1-9: 5
4. FEELING TIRED OR HAVING LITTLE ENERGY: NEARLY EVERY DAY
3. TROUBLE FALLING OR STAYING ASLEEP OR SLEEPING TOO MUCH: MORE THAN HALF THE DAYS

## 2025-08-11 ASSESSMENT — HEART SCORE
RISK FACTORS: NO KNOWN RISK FACTORS
ECG: NORMAL
HEART SCORE: 0
TROPONIN: LESS THAN OR EQUAL TO NORMAL LIMIT
AGE: <45
HISTORY: SLIGHTLY SUSPICIOUS

## 2025-08-11 ASSESSMENT — FIBROSIS 4 INDEX
FIB4 SCORE: .4992301766027062098
FIB4 SCORE: .4992301766027062098